# Patient Record
Sex: FEMALE | Race: WHITE | NOT HISPANIC OR LATINO | Employment: UNEMPLOYED | URBAN - METROPOLITAN AREA
[De-identification: names, ages, dates, MRNs, and addresses within clinical notes are randomized per-mention and may not be internally consistent; named-entity substitution may affect disease eponyms.]

---

## 2021-11-13 PROBLEM — R31.9 HEMATURIA: Status: ACTIVE | Noted: 2021-11-13

## 2021-11-13 PROBLEM — M32.9 SYSTEMIC LUPUS ERYTHEMATOSUS: Status: ACTIVE | Noted: 2021-11-13

## 2021-11-13 PROBLEM — Z72.0 TOBACCO ABUSE: Status: ACTIVE | Noted: 2021-11-13

## 2021-11-13 PROBLEM — G43.909 MIGRAINE: Status: ACTIVE | Noted: 2021-11-13

## 2021-11-13 PROBLEM — J44.9 COPD (CHRONIC OBSTRUCTIVE PULMONARY DISEASE): Status: ACTIVE | Noted: 2021-11-13

## 2021-11-14 PROBLEM — R10.9 FLANK PAIN: Status: ACTIVE | Noted: 2021-11-14

## 2023-01-25 ENCOUNTER — OFFICE VISIT (OUTPATIENT)
Dept: FAMILY MEDICINE | Facility: CLINIC | Age: 48
End: 2023-01-25
Payer: MEDICAID

## 2023-01-25 ENCOUNTER — TELEPHONE (OUTPATIENT)
Dept: FAMILY MEDICINE | Facility: CLINIC | Age: 48
End: 2023-01-25

## 2023-01-25 VITALS
DIASTOLIC BLOOD PRESSURE: 83 MMHG | WEIGHT: 169.75 LBS | RESPIRATION RATE: 18 BRPM | TEMPERATURE: 98 F | HEART RATE: 87 BPM | SYSTOLIC BLOOD PRESSURE: 118 MMHG | OXYGEN SATURATION: 96 % | BODY MASS INDEX: 33.15 KG/M2

## 2023-01-25 DIAGNOSIS — Z72.0 TOBACCO ABUSE: ICD-10-CM

## 2023-01-25 DIAGNOSIS — M32.9 SYSTEMIC LUPUS ERYTHEMATOSUS, UNSPECIFIED SLE TYPE, UNSPECIFIED ORGAN INVOLVEMENT STATUS: ICD-10-CM

## 2023-01-25 DIAGNOSIS — D22.9 ATYPICAL NEVI: ICD-10-CM

## 2023-01-25 DIAGNOSIS — J44.9 CHRONIC OBSTRUCTIVE PULMONARY DISEASE, UNSPECIFIED COPD TYPE: Primary | ICD-10-CM

## 2023-01-25 PROCEDURE — 1159F PR MEDICATION LIST DOCUMENTED IN MEDICAL RECORD: ICD-10-PCS | Mod: CPTII,S$GLB,, | Performed by: PHYSICIAN ASSISTANT

## 2023-01-25 PROCEDURE — 3074F PR MOST RECENT SYSTOLIC BLOOD PRESSURE < 130 MM HG: ICD-10-PCS | Mod: CPTII,S$GLB,, | Performed by: PHYSICIAN ASSISTANT

## 2023-01-25 PROCEDURE — 1160F PR REVIEW ALL MEDS BY PRESCRIBER/CLIN PHARMACIST DOCUMENTED: ICD-10-PCS | Mod: CPTII,S$GLB,, | Performed by: PHYSICIAN ASSISTANT

## 2023-01-25 PROCEDURE — 1160F RVW MEDS BY RX/DR IN RCRD: CPT | Mod: CPTII,S$GLB,, | Performed by: PHYSICIAN ASSISTANT

## 2023-01-25 PROCEDURE — 3008F PR BODY MASS INDEX (BMI) DOCUMENTED: ICD-10-PCS | Mod: CPTII,S$GLB,, | Performed by: PHYSICIAN ASSISTANT

## 2023-01-25 PROCEDURE — 99204 OFFICE O/P NEW MOD 45 MIN: CPT | Mod: S$GLB,,, | Performed by: PHYSICIAN ASSISTANT

## 2023-01-25 PROCEDURE — 3008F BODY MASS INDEX DOCD: CPT | Mod: CPTII,S$GLB,, | Performed by: PHYSICIAN ASSISTANT

## 2023-01-25 PROCEDURE — 1159F MED LIST DOCD IN RCRD: CPT | Mod: CPTII,S$GLB,, | Performed by: PHYSICIAN ASSISTANT

## 2023-01-25 PROCEDURE — 3079F PR MOST RECENT DIASTOLIC BLOOD PRESSURE 80-89 MM HG: ICD-10-PCS | Mod: CPTII,S$GLB,, | Performed by: PHYSICIAN ASSISTANT

## 2023-01-25 PROCEDURE — 99204 PR OFFICE/OUTPT VISIT, NEW, LEVL IV, 45-59 MIN: ICD-10-PCS | Mod: S$GLB,,, | Performed by: PHYSICIAN ASSISTANT

## 2023-01-25 PROCEDURE — 3079F DIAST BP 80-89 MM HG: CPT | Mod: CPTII,S$GLB,, | Performed by: PHYSICIAN ASSISTANT

## 2023-01-25 PROCEDURE — 3074F SYST BP LT 130 MM HG: CPT | Mod: CPTII,S$GLB,, | Performed by: PHYSICIAN ASSISTANT

## 2023-01-25 RX ORDER — BUDESONIDE AND FORMOTEROL FUMARATE DIHYDRATE 160; 4.5 UG/1; UG/1
2 AEROSOL RESPIRATORY (INHALATION) EVERY 12 HOURS
Qty: 10.2 G | Refills: 5 | Status: SHIPPED | OUTPATIENT
Start: 2023-01-25 | End: 2023-04-25 | Stop reason: SDUPTHER

## 2023-01-25 RX ORDER — HYDROCHLOROTHIAZIDE 25 MG/1
25 TABLET ORAL DAILY
COMMUNITY
End: 2023-04-25

## 2023-01-25 RX ORDER — METOPROLOL SUCCINATE 50 MG/1
50 TABLET, EXTENDED RELEASE ORAL DAILY
COMMUNITY
End: 2023-04-25

## 2023-01-25 RX ORDER — HYDROXYZINE HYDROCHLORIDE 50 MG/1
50 TABLET, FILM COATED ORAL 3 TIMES DAILY
COMMUNITY
End: 2023-04-25 | Stop reason: SDUPTHER

## 2023-01-25 RX ORDER — PREDNISONE 20 MG/1
20 TABLET ORAL 2 TIMES DAILY
COMMUNITY
End: 2023-02-13 | Stop reason: SDUPTHER

## 2023-01-25 RX ORDER — ALBUTEROL SULFATE 90 UG/1
2 AEROSOL, METERED RESPIRATORY (INHALATION) EVERY 6 HOURS PRN
Qty: 18 G | Refills: 0 | Status: SHIPPED | OUTPATIENT
Start: 2023-01-25 | End: 2023-04-25 | Stop reason: SDUPTHER

## 2023-01-25 RX ORDER — HYDROCODONE BITARTRATE AND ACETAMINOPHEN 10; 325 MG/1; MG/1
1 TABLET ORAL EVERY 6 HOURS PRN
Qty: 21 TABLET | Refills: 0 | Status: SHIPPED | OUTPATIENT
Start: 2023-01-25 | End: 2023-02-13 | Stop reason: SDUPTHER

## 2023-01-25 NOTE — TELEPHONE ENCOUNTER
Called pt, she was supposed to have MRI/MRA ordered by her neurologist at [facility], but it was out of her 30 day window and needed a new clearance. During time, neurology was able to get in touch with radiology and everything was taken care of.

## 2023-01-25 NOTE — PROGRESS NOTES
Subjective:       Patient ID: Wanda May is a 47 y.o. female.    Chief Complaint: Establish Care    Patient is a 46 yo female coming in to establish care with me. She has multiple medical issues    Patient Active Problem List:     Hematuria     Systemic lupus erythematosus: condition is uncontrolled. She has no rheumatologist that follows her       COPD (chronic obstructive pulmonary disease): condition is uncontrolled. She has no pulmologist that follows her.      Tobacco abuse: Report that she has tried multiple medications to quit which none have worked.        Flank pain    Past Medical History:  No date: Anxiety  No date: Arthritis  No date: COPD (chronic obstructive pulmonary disease)  No date: COVID-19      Comment:  2021  No date: IBS (irritable bowel syndrome)  No date: Interstitial cystitis (chronic) with hematuria      Comment:  per patient  No date: Lupus      Comment:  per patient  No date: Migraine headache  No date: Mixed connective tissue disease      Comment:  per patient  No date: Nephrolithiasis    Past Surgical History:  No date: APPENDECTOMY  :  SECTION  No date: CHOLECYSTECTOMY  No date: Cyst removed from urethra  No date: HYSTERECTOMY    Review of patient's family history indicates:      Social History    Socioeconomic History      Marital status:     Tobacco Use      Smoking status: Every Day        Packs/day: 1.00        Types: Cigarettes      Smokeless tobacco: Never    Substance and Sexual Activity      Alcohol use: Yes        Comment: Occasionally      Drug use: Never      Review of patient's allergies indicates:   -- Ketorolac -- Shortness Of Breath, Anaphylaxis,                            Hives, Itching and Swelling    --  Other reaction(s): Other (See Comments), Other             (See Comments)Throat swelling             states throat closes             states throat closes   -- Levofloxacin -- Anaphylaxis, Hives, Shortness Of                             Breath and Swelling    --  Throat swelling   -- Metoclopramide -- Hives, Itching and Swelling    --  Other reaction(s): Other (See Comments), Other             (See Comments)             Throat swelling             States throat closes             States throat closes   -- Reglan [metoclopramide hcl] -- Anaphylaxis   -- Sulfa (sulfonamide antibiotics) -- Anaphylaxis    --  Other reaction(s): Other (See Comments), Other             (See Comments)             Childhood allergy (unknown reaction)             Doesn't know    Current Outpatient Medications: ·  butalbital-acetaminophen-caffeine -40 mg (FIORICET, ESGIC) -40 mg per tablet, Take 1 tablet by mouth every 6 (six) hours as needed for Headaches., Disp: , Rfl: ·  cyanocobalamin 1,000 mcg/mL injection, Inject 1,000 mcg into the muscle once a week., Disp: , Rfl: ·  diazePAM (VALIUM) 10 MG Tab, Take 10 mg by mouth 2 (two) times daily as needed., Disp: , Rfl: ·  ergocalciferol (ERGOCALCIFEROL) 50,000 unit Cap, Take 50,000 Units by mouth every 7 days., Disp: , Rfl: ·  hydroCHLOROthiazide (HYDRODIURIL) 25 MG tablet, Take 25 mg by mouth once daily., Disp: , Rfl: ·  hydrOXYzine (ATARAX) 50 MG tablet, Take 50 mg by mouth 3 (three) times daily., Disp: , Rfl: ·  methocarbamoL (ROBAXIN) 500 MG Tab, Take 500 mg by mouth 3 (three) times daily., Disp: , Rfl: ·  metoprolol succinate (TOPROL-XL) 50 MG 24 hr tablet, Take 50 mg by mouth once daily. Take one tablet by mouth two times daily, Disp: , Rfl: ·  ondansetron (ZOFRAN) 4 MG tablet, Take 8 mg by mouth every 6 (six) hours as needed for Nausea., Disp: , Rfl: ·  predniSONE (DELTASONE) 20 MG tablet, Take 20 mg by mouth 2 (two) times daily., Disp: , Rfl: ·  promethazine (PHENERGAN) 25 MG tablet, Take 25 mg by mouth every 6 (six) hours as needed for Nausea., Disp: , Rfl: ·  propranoloL (INDERAL) 10 MG tablet, Take 10 mg by mouth 2 (two) times daily., Disp: , Rfl: ·  albuterol (VENTOLIN HFA) 90 mcg/actuation inhaler,  Inhale 2 puffs into the lungs every 6 (six) hours as needed for Wheezing. Rescue, Disp: 18 g, Rfl: 0·  bacitracin 500 unit/gram Oint, Apply topically 2 (two) times daily. (Patient not taking: Reported on 1/25/2023), Disp: 30 g, Rfl: 0·  budesonide-formoterol 160-4.5 mcg (SYMBICORT) 160-4.5 mcg/actuation HFAA, Inhale 2 puffs into the lungs every 12 (twelve) hours. Controller, Disp: 10.2 g, Rfl: 5·  HYDROcodone-acetaminophen (NORCO)  mg per tablet, Take 1 tablet by mouth every 6 (six) hours as needed for Pain., Disp: 21 tablet, Rfl: 0·  ibuprofen (ADVIL,MOTRIN) 800 MG tablet, Take 800 mg by mouth every 8 (eight) hours as needed for Pain., Disp: , Rfl:     /83   Pulse 87   Temp 97.9 °F (36.6 °C)   Resp 18   Wt 77 kg (169 lb 12.1 oz)   SpO2 96%   BMI 33.15 kg/m²             Review of Systems   Constitutional:  Positive for fatigue. Negative for activity change, chills and fever.   HENT: Negative.     Eyes: Negative.    Respiratory:  Negative for cough, chest tightness and shortness of breath.    Cardiovascular:  Positive for chest pain.        Has chronic rib pain   Gastrointestinal:  Positive for abdominal pain. Negative for blood in stool and nausea.   Endocrine: Negative.    Genitourinary: Negative.    Musculoskeletal:  Positive for arthralgias, back pain, joint swelling and myalgias.   Integumentary:  Positive for mole/lesion.        Has several moles which she desires to see Dermatology   Allergic/Immunologic: Negative.    Neurological:  Negative for dizziness, seizures, weakness, headaches and memory loss.   Hematological:  Negative for adenopathy. Does not bruise/bleed easily.   Psychiatric/Behavioral:  Positive for dysphoric mood. Negative for sleep disturbance and suicidal ideas. The patient is nervous/anxious.        Objective:      Physical Exam  Vitals reviewed.   Constitutional:       General: She is not in acute distress.     Appearance: Normal appearance. She is not ill-appearing,  toxic-appearing or diaphoretic.   HENT:      Head: Normocephalic and atraumatic.      Right Ear: Tympanic membrane, ear canal and external ear normal. There is no impacted cerumen.      Left Ear: Tympanic membrane, ear canal and external ear normal. There is no impacted cerumen.      Nose: Nose normal. No congestion or rhinorrhea.      Mouth/Throat:      Mouth: Mucous membranes are moist.   Eyes:      Conjunctiva/sclera: Conjunctivae normal.   Neck:      Vascular: No carotid bruit.   Cardiovascular:      Rate and Rhythm: Normal rate and regular rhythm.      Pulses: Normal pulses.      Heart sounds: Normal heart sounds. No murmur heard.    No friction rub. No gallop.   Pulmonary:      Effort: Pulmonary effort is normal. No respiratory distress.      Breath sounds: Normal breath sounds. No stridor. No wheezing, rhonchi or rales.   Chest:      Chest wall: Tenderness present.   Abdominal:      General: There is no distension.      Palpations: Abdomen is soft. There is no mass.      Tenderness: There is abdominal tenderness. There is no right CVA tenderness, left CVA tenderness, guarding or rebound.      Hernia: No hernia is present.   Musculoskeletal:         General: Tenderness present. No swelling.      Cervical back: No rigidity or tenderness.   Lymphadenopathy:      Cervical: No cervical adenopathy.   Skin:     General: Skin is warm and dry.      Coloration: Skin is not jaundiced.   Neurological:      Mental Status: She is alert.       Assessment:       Problem List Items Addressed This Visit       Tobacco abuse    Systemic lupus erythematosus    Relevant Orders    Ambulatory referral/consult to Rheumatology    COPD (chronic obstructive pulmonary disease) - Primary    Relevant Orders    Ambulatory referral/consult to Pulmonology     Other Visit Diagnoses       Atypical nevi        Relevant Orders    Ambulatory referral/consult to Dermatology              Plan:       Chronic obstructive pulmonary disease, unspecified  COPD type  uncontrolled   -     Ambulatory referral/consult to Pulmonology; Future; Expected date: 02/01/2023    Systemic lupus erythematosus, unspecified SLE type, unspecified organ involvement status  uncontrolled   -     Ambulatory referral/consult to Rheumatology; Future; Expected date: 02/01/2023    Atypical nevi  -     Ambulatory referral/consult to Dermatology; Future; Expected date: 02/01/2023    Tobacco abuse  Resistant to quit    Other orders  -     budesonide-formoterol 160-4.5 mcg (SYMBICORT) 160-4.5 mcg/actuation HFAA; Inhale 2 puffs into the lungs every 12 (twelve) hours. Controller  Dispense: 10.2 g; Refill: 5  -     albuterol (VENTOLIN HFA) 90 mcg/actuation inhaler; Inhale 2 puffs into the lungs every 6 (six) hours as needed for Wheezing. Rescue  Dispense: 18 g; Refill: 0  -     HYDROcodone-acetaminophen (NORCO)  mg per tablet; Take 1 tablet by mouth every 6 (six) hours as needed for Pain.  Dispense: 21 tablet; Refill: 0       I spent 45 minutes on this encounter, time includes face-to-face, chart review, documentation, test review and orders.

## 2023-01-25 NOTE — TELEPHONE ENCOUNTER
----- Message from Dennis Teran sent at 1/25/2023 12:29 PM CST -----  Contact: Self  Type: Needs Medical Advice  Who Called:  Patient  Best Call Back Number: [phone]   Additional Information:  States she needs anesthesia clearance for MRI at [place]. Fax: [phone]    Would also like to speak with office to discuss

## 2023-01-25 NOTE — LETTER
AUTHORIZATION FOR RELEASE OF   CONFIDENTIAL INFORMATION    Dear Maykel NAPOLES,    We are seeing Wanda May, date of birth 1975, in the clinic at [facility]. Adria Muse III, PA-C is the patient's PCP. Wanda May has an outstanding lab/procedure at the time we reviewed her chart. In order to help keep her health information updated, she has authorized us to request the following medical record(s):        (  )  MAMMOGRAM                                      (  )  COLONOSCOPY      (  )  PAP SMEAR                                          ( XX )  OUTSIDE LAB RESULTS     (  )  DEXA SCAN                                          (  )  EYE EXAM            (  )  FOOT EXAM                                          (  )  ENTIRE RECORD     (  )  OUTSIDE IMMUNIZATIONS                 ( XX ) Recent CT Results          Please fax records to [facility] at [phone]      If you have any questions, please contact us at [phone].            Patient Name: Wanda May  : 1975  Patient Phone #: [phone]

## 2023-01-25 NOTE — TELEPHONE ENCOUNTER
Pt seen today to est care. She has dx of erica and needs to see Rheumatology, but medicaid. Any providers taking new medicaid?  Please advise.

## 2023-01-25 NOTE — TELEPHONE ENCOUNTER
----- Message from Evangelina Huerta sent at 1/25/2023  2:22 PM CST -----  Name of Who is Calling:STEFAN HEATH [[id number]]       What is the request in detail: pt stated that mri/mra fax can be disregarded. Pt does not need it anymore. Pt cancelled appts and will wait to see if dr would like to reschedule        Can the clinic reply by [facility]: Yes        [facility] [phone]

## 2023-02-09 ENCOUNTER — PATIENT MESSAGE (OUTPATIENT)
Dept: FAMILY MEDICINE | Facility: CLINIC | Age: 48
End: 2023-02-09
Payer: MEDICAID

## 2023-02-09 RX ORDER — HYDROCODONE BITARTRATE AND ACETAMINOPHEN 10; 325 MG/1; MG/1
1 TABLET ORAL EVERY 6 HOURS PRN
Qty: 21 TABLET | Refills: 0 | OUTPATIENT
Start: 2023-02-09

## 2023-02-13 ENCOUNTER — OFFICE VISIT (OUTPATIENT)
Dept: FAMILY MEDICINE | Facility: CLINIC | Age: 48
End: 2023-02-13
Payer: MEDICAID

## 2023-02-13 ENCOUNTER — HOSPITAL ENCOUNTER (OUTPATIENT)
Dept: RADIOLOGY | Facility: CLINIC | Age: 48
Discharge: HOME | End: 2023-02-13
Attending: PHYSICIAN ASSISTANT
Payer: MEDICAID

## 2023-02-13 VITALS
DIASTOLIC BLOOD PRESSURE: 82 MMHG | BODY MASS INDEX: 33.52 KG/M2 | OXYGEN SATURATION: 97 % | WEIGHT: 171.63 LBS | SYSTOLIC BLOOD PRESSURE: 138 MMHG | HEART RATE: 99 BPM

## 2023-02-13 DIAGNOSIS — M54.9 DORSALGIA, UNSPECIFIED: ICD-10-CM
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
## 2023-02-13 LAB
BILIRUBIN, UA POC OHS: NEGATIVE
BLOOD, UA POC OHS: NEGATIVE
CLARITY, UA POC OHS: CLEAR
COLOR, UA POC OHS: YELLOW
GLUCOSE, UA POC OHS: NEGATIVE
KETONES, UA POC OHS: NEGATIVE
LEUKOCYTES, UA POC OHS: NEGATIVE
NITRITE, UA POC OHS: NEGATIVE
PH, UA POC OHS: 6
PROTEIN, UA POC OHS: NEGATIVE
SPECIFIC GRAVITY, UA POC OHS: 1.02
UROBILINOGEN, UA POC OHS: 0.2

## 2023-02-13 PROCEDURE — 3008F BODY MASS INDEX DOCD: CPT | Mod: CPTII,S$GLB,, | Performed by: PHYSICIAN ASSISTANT

## 2023-02-13 PROCEDURE — 3075F PR MOST RECENT SYSTOLIC BLOOD PRESS GE 130-139MM HG: ICD-10-PCS | Mod: CPTII,S$GLB,, | Performed by: PHYSICIAN ASSISTANT

## 2023-02-13 PROCEDURE — 81003 URINALYSIS AUTO W/O SCOPE: CPT | Mod: QW,S$GLB,, | Performed by: PHYSICIAN ASSISTANT

## 2023-02-13 PROCEDURE — 1160F RVW MEDS BY RX/DR IN RCRD: CPT | Mod: CPTII,S$GLB,, | Performed by: PHYSICIAN ASSISTANT

## 2023-02-13 PROCEDURE — 1160F PR REVIEW ALL MEDS BY PRESCRIBER/CLIN PHARMACIST DOCUMENTED: ICD-10-PCS | Mod: CPTII,S$GLB,, | Performed by: PHYSICIAN ASSISTANT

## 2023-02-13 PROCEDURE — 72100 X-RAY EXAM L-S SPINE 2/3 VWS: CPT | Mod: S$GLB,,, | Performed by: RADIOLOGY

## 2023-02-13 PROCEDURE — 1159F MED LIST DOCD IN RCRD: CPT | Mod: CPTII,S$GLB,, | Performed by: PHYSICIAN ASSISTANT

## 2023-02-13 PROCEDURE — 87086 URINE CULTURE/COLONY COUNT: CPT | Performed by: PHYSICIAN ASSISTANT

## 2023-02-13 PROCEDURE — 3008F PR BODY MASS INDEX (BMI) DOCUMENTED: ICD-10-PCS | Mod: CPTII,S$GLB,, | Performed by: PHYSICIAN ASSISTANT

## 2023-02-13 PROCEDURE — 3075F SYST BP GE 130 - 139MM HG: CPT | Mod: CPTII,S$GLB,, | Performed by: PHYSICIAN ASSISTANT

## 2023-02-13 PROCEDURE — 3079F PR MOST RECENT DIASTOLIC BLOOD PRESSURE 80-89 MM HG: ICD-10-PCS | Mod: CPTII,S$GLB,, | Performed by: PHYSICIAN ASSISTANT

## 2023-02-13 PROCEDURE — 72100 XR LUMBAR SPINE 2 OR 3 VIEWS: ICD-10-PCS | Mod: S$GLB,,, | Performed by: RADIOLOGY

## 2023-02-13 PROCEDURE — 1159F PR MEDICATION LIST DOCUMENTED IN MEDICAL RECORD: ICD-10-PCS | Mod: CPTII,S$GLB,, | Performed by: PHYSICIAN ASSISTANT

## 2023-02-13 PROCEDURE — 81003 POCT URINALYSIS(INSTRUMENT): ICD-10-PCS | Mod: QW,S$GLB,, | Performed by: PHYSICIAN ASSISTANT

## 2023-02-13 PROCEDURE — 99215 OFFICE O/P EST HI 40 MIN: CPT | Mod: S$GLB,,, | Performed by: PHYSICIAN ASSISTANT

## 2023-02-13 PROCEDURE — 99215 PR OFFICE/OUTPT VISIT, EST, LEVL V, 40-54 MIN: ICD-10-PCS | Mod: S$GLB,,, | Performed by: PHYSICIAN ASSISTANT

## 2023-02-13 PROCEDURE — 3079F DIAST BP 80-89 MM HG: CPT | Mod: CPTII,S$GLB,, | Performed by: PHYSICIAN ASSISTANT

## 2023-02-13 RX ORDER — PREDNISONE 20 MG/1
20 TABLET ORAL 2 TIMES DAILY
Qty: 20 TABLET | Refills: 0 | Status: SHIPPED | OUTPATIENT
Start: 2023-02-13 | End: 2023-04-25

## 2023-02-13 RX ORDER — HYDROCODONE BITARTRATE AND ACETAMINOPHEN 10; 325 MG/1; MG/1
1 TABLET ORAL EVERY 6 HOURS PRN
Qty: 21 TABLET | Refills: 0 | Status: SHIPPED | OUTPATIENT
Start: 2023-02-13 | End: 2023-03-21 | Stop reason: SDUPTHER

## 2023-02-13 RX ORDER — ONDANSETRON 4 MG/1
8 TABLET, FILM COATED ORAL EVERY 6 HOURS PRN
Qty: 20 TABLET | Refills: 5 | Status: SHIPPED | OUTPATIENT
Start: 2023-02-13 | End: 2023-04-25

## 2023-02-13 NOTE — PROGRESS NOTES
Patient ID: Wanda Hirsch is a 47 y.o. female.     Chief Complaint: Fall (Rib Pain)     48yo female with SLE and COPD presents complaining of pain in lower back and flank areas due to a fall last Thursday morning. Pt also complains of chronic abdominal pain and bloody tissue expulsion while urinating.      Fall  The accident occurred 3 to 5 days ago. Fall occurred: In patient's home from standing position while walking. Pt tripped over a TV box. There was no blood loss. The pain is present in the back (Lower back and bilateral flanks). The pain is severe. The symptoms are aggravated by ambulation, standing and pressure on injury (Pt cannot lay on side without pain). Associated symptoms include abdominal pain, hematuria and nausea. Pertinent negatives include no bowel incontinence, fever, headaches, hearing loss, loss of consciousness, numbness, tingling, visual change or vomiting. Associated symptoms comments: Pt notes chronic urinary sx due to interstitial cystitis. Treatments tried: ibuprofen, prescription NARCO, zofran. The treatment provided mild relief.   Review of Systems   Constitutional: Negative.  Negative for fever.   HENT: Negative.     Eyes: Negative.    Respiratory:  Positive for shortness of breath.         SOB upon inspiration   Cardiovascular: Negative.    Gastrointestinal:  Positive for abdominal pain and nausea. Negative for bowel incontinence and vomiting.   Genitourinary:  Positive for flank pain and hematuria.        Pt showed photo of clot-like bloody tissue that expelled while she was straining to urinate.   Musculoskeletal:  Positive for back pain.   Neurological:  Positive for dizziness. Negative for tingling, loss of consciousness, syncope, numbness and headaches.            Past Medical History:   Diagnosis Date    Anxiety      Arthritis      COPD (chronic obstructive pulmonary disease)      COVID-19 sept 2021    IBS (irritable bowel syndrome)      Interstitial cystitis (chronic)  with hematuria       per patient    Lupus       per patient    Migraine headache      Mixed connective tissue disease       per patient    Nephrolithiasis        Objective:   Physical Exam  Vitals reviewed.   Constitutional:       Appearance: Normal appearance.      Comments: Pt looks to be in pain   HENT:      Head: Normocephalic and atraumatic.   Cardiovascular:      Rate and Rhythm: Normal rate and regular rhythm.      Pulses: Normal pulses.      Heart sounds: No murmur heard.    No friction rub. No gallop.   Pulmonary:      Effort: Pulmonary effort is normal.      Breath sounds: Normal breath sounds. No wheezing, rhonchi or rales.         Comments: Light bruising and tenderness on bilateral flank areas  Abdominal:      General: Bowel sounds are normal. There is no distension.      Palpations: Abdomen is soft.      Tenderness: There is abdominal tenderness. There is no rebound.   Neurological:      Mental Status: She is alert.       Assessment:       Problem List Items Addressed This Visit    None  Visit Diagnoses         Fall, initial encounter    -  Primary     Relevant Orders     POCT Urinalysis(Instrument) (Completed)     Urine culture     Dorsalgia, unspecified         Relevant Orders     X-Ray Lumbar Spine 2 Or 3 Views             Plan:           Fall, initial encounter  -     POCT Urinalysis(Instrument)  -     Urine culture     Dorsalgia, unspecified  -     X-Ray Lumbar Spine 2 Or 3 Views; Future; Expected date: 02/13/2023     Other orders  -     HYDROcodone-acetaminophen (NORCO)  mg per tablet; Take 1 tablet by mouth every 6 (six) hours as needed for Pain.  Dispense: 21 tablet; Refill: 0  -     predniSONE (DELTASONE) 20 MG tablet; Take 1 tablet (20 mg total) by mouth 2 (two) times daily.  Dispense: 20 tablet; Refill: 0  -     ondansetron (ZOFRAN) 4 MG tablet; Take 2 tablets (8 mg total) by mouth every 6 (six) hours as needed for Nausea.  Dispense: 20 tablet; Refill: 5      --Pt can use heating pad  on area as needed for pain relief.  --Pt to contact clinic should symptoms worsen or not improve within 7-10 days.     I spent 30 minutes on this encounter including face-to-face encounter, chart review, documentation, test review, and orders.

## 2023-02-13 NOTE — MEDICAL/APP STUDENT
Subjective:       Patient ID: Wanda Hirsch is a 47 y.o. female.    Chief Complaint: Fall (Rib Pain)    46yo female with SLE and COPD presents complaining of pain in lower back and flank areas due to a fall last Thursday morning. Pt also complains of chronic abdominal pain and bloody tissue expulsion while urinating.     Fall  The accident occurred 3 to 5 days ago. Fall occurred: In patient's home from standing position while walking. Pt tripped over a TV box. There was no blood loss. The pain is present in the back (Lower back and bilateral flanks). The pain is severe. The symptoms are aggravated by ambulation, standing and pressure on injury (Pt cannot lay on side without pain). Associated symptoms include abdominal pain, hematuria and nausea. Pertinent negatives include no bowel incontinence, fever, headaches, hearing loss, loss of consciousness, numbness, tingling, visual change or vomiting. Associated symptoms comments: Pt notes chronic urinary sx due to interstitial cystitis. Treatments tried: ibuprofen, prescription NARCO, zofran. The treatment provided mild relief.   Review of Systems   Constitutional: Negative.  Negative for fever.   HENT: Negative.     Eyes: Negative.    Respiratory:  Positive for shortness of breath.         SOB upon inspiration   Cardiovascular: Negative.    Gastrointestinal:  Positive for abdominal pain and nausea. Negative for bowel incontinence and vomiting.   Genitourinary:  Positive for flank pain and hematuria.        Pt showed photo of clot-like bloody tissue that expelled while she was straining to urinate.   Musculoskeletal:  Positive for back pain.   Neurological:  Positive for dizziness. Negative for tingling, loss of consciousness, syncope, numbness and headaches.       Past Medical History:   Diagnosis Date    Anxiety     Arthritis     COPD (chronic obstructive pulmonary disease)     COVID-19 sept 2021    IBS (irritable bowel syndrome)     Interstitial cystitis  (chronic) with hematuria     per patient    Lupus     per patient    Migraine headache     Mixed connective tissue disease     per patient    Nephrolithiasis       Objective:      Physical Exam  Vitals reviewed.   Constitutional:       Appearance: Normal appearance.      Comments: Pt looks to be in pain   HENT:      Head: Normocephalic and atraumatic.   Cardiovascular:      Rate and Rhythm: Normal rate and regular rhythm.      Pulses: Normal pulses.      Heart sounds: No murmur heard.    No friction rub. No gallop.   Pulmonary:      Effort: Pulmonary effort is normal.      Breath sounds: Normal breath sounds. No wheezing, rhonchi or rales.          Comments: Light bruising and tenderness on bilateral flank areas  Abdominal:      General: Bowel sounds are normal. There is no distension.      Palpations: Abdomen is soft.      Tenderness: There is abdominal tenderness. There is no rebound.   Neurological:      Mental Status: She is alert.       Assessment:       Problem List Items Addressed This Visit    None  Visit Diagnoses       Fall, initial encounter    -  Primary    Relevant Orders    POCT Urinalysis(Instrument) (Completed)    Urine culture    Dorsalgia, unspecified        Relevant Orders    X-Ray Lumbar Spine 2 Or 3 Views              Plan:           Fall, initial encounter  -     POCT Urinalysis(Instrument)  -     Urine culture    Dorsalgia, unspecified  -     X-Ray Lumbar Spine 2 Or 3 Views; Future; Expected date: 02/13/2023    Other orders  -     HYDROcodone-acetaminophen (NORCO)  mg per tablet; Take 1 tablet by mouth every 6 (six) hours as needed for Pain.  Dispense: 21 tablet; Refill: 0  -     predniSONE (DELTASONE) 20 MG tablet; Take 1 tablet (20 mg total) by mouth 2 (two) times daily.  Dispense: 20 tablet; Refill: 0  -     ondansetron (ZOFRAN) 4 MG tablet; Take 2 tablets (8 mg total) by mouth every 6 (six) hours as needed for Nausea.  Dispense: 20 tablet; Refill: 5     --Pt can use heating pad on  area as needed for pain relief.  --Pt to contact clinic should symptoms worsen or not improve within 7-10 days.    I spent 30 minutes on this encounter including face-to-face encounter, chart review, documentation, test review, and orders.

## 2023-02-14 ENCOUNTER — PATIENT MESSAGE (OUTPATIENT)
Dept: ADMINISTRATIVE | Facility: HOSPITAL | Age: 48
End: 2023-02-14
Payer: MEDICAID

## 2023-02-14 LAB — BACTERIA UR CULT: NO GROWTH

## 2023-02-15 DIAGNOSIS — Z12.31 OTHER SCREENING MAMMOGRAM: ICD-10-CM

## 2023-02-16 DIAGNOSIS — Z12.11 SCREENING FOR COLON CANCER: ICD-10-CM

## 2023-03-02 RX ORDER — HYDROCODONE BITARTRATE AND ACETAMINOPHEN 10; 325 MG/1; MG/1
TABLET ORAL
Qty: 21 TABLET | OUTPATIENT
Start: 2023-03-02

## 2023-03-03 ENCOUNTER — PATIENT MESSAGE (OUTPATIENT)
Dept: FAMILY MEDICINE | Facility: CLINIC | Age: 48
End: 2023-03-03
Payer: MEDICAID

## 2023-03-18 LAB — HEMOCCULT STL QL IA: NEGATIVE

## 2023-03-21 ENCOUNTER — OFFICE VISIT (OUTPATIENT)
Dept: FAMILY MEDICINE | Facility: CLINIC | Age: 48
End: 2023-03-21
Payer: MEDICAID

## 2023-03-21 VITALS
SYSTOLIC BLOOD PRESSURE: 144 MMHG | DIASTOLIC BLOOD PRESSURE: 98 MMHG | OXYGEN SATURATION: 99 % | HEART RATE: 80 BPM | BODY MASS INDEX: 33.6 KG/M2 | WEIGHT: 172.06 LBS

## 2023-03-21 DIAGNOSIS — R10.31 RIGHT INGUINAL PAIN: ICD-10-CM

## 2023-03-21 DIAGNOSIS — R07.89 LEFT-SIDED CHEST WALL PAIN: Primary | ICD-10-CM

## 2023-03-21 DIAGNOSIS — R10.31 RIGHT LOWER QUADRANT PAIN: ICD-10-CM

## 2023-03-21 PROCEDURE — 1159F MED LIST DOCD IN RCRD: CPT | Mod: CPTII,S$GLB,, | Performed by: PHYSICIAN ASSISTANT

## 2023-03-21 PROCEDURE — 1159F PR MEDICATION LIST DOCUMENTED IN MEDICAL RECORD: ICD-10-PCS | Mod: CPTII,S$GLB,, | Performed by: PHYSICIAN ASSISTANT

## 2023-03-21 PROCEDURE — 3077F PR MOST RECENT SYSTOLIC BLOOD PRESSURE >= 140 MM HG: ICD-10-PCS | Mod: CPTII,S$GLB,, | Performed by: PHYSICIAN ASSISTANT

## 2023-03-21 PROCEDURE — 99214 PR OFFICE/OUTPT VISIT, EST, LEVL IV, 30-39 MIN: ICD-10-PCS | Mod: S$GLB,,, | Performed by: PHYSICIAN ASSISTANT

## 2023-03-21 PROCEDURE — 3008F PR BODY MASS INDEX (BMI) DOCUMENTED: ICD-10-PCS | Mod: CPTII,S$GLB,, | Performed by: PHYSICIAN ASSISTANT

## 2023-03-21 PROCEDURE — 3008F BODY MASS INDEX DOCD: CPT | Mod: CPTII,S$GLB,, | Performed by: PHYSICIAN ASSISTANT

## 2023-03-21 PROCEDURE — 3077F SYST BP >= 140 MM HG: CPT | Mod: CPTII,S$GLB,, | Performed by: PHYSICIAN ASSISTANT

## 2023-03-21 PROCEDURE — 3080F DIAST BP >= 90 MM HG: CPT | Mod: CPTII,S$GLB,, | Performed by: PHYSICIAN ASSISTANT

## 2023-03-21 PROCEDURE — 3080F PR MOST RECENT DIASTOLIC BLOOD PRESSURE >= 90 MM HG: ICD-10-PCS | Mod: CPTII,S$GLB,, | Performed by: PHYSICIAN ASSISTANT

## 2023-03-21 PROCEDURE — 99214 OFFICE O/P EST MOD 30 MIN: CPT | Mod: S$GLB,,, | Performed by: PHYSICIAN ASSISTANT

## 2023-03-21 RX ORDER — HYDROCODONE BITARTRATE AND ACETAMINOPHEN 10; 325 MG/1; MG/1
1 TABLET ORAL EVERY 6 HOURS PRN
Qty: 21 TABLET | Refills: 0 | Status: SHIPPED | OUTPATIENT
Start: 2023-03-21 | End: 2023-04-25 | Stop reason: SDUPTHER

## 2023-03-21 NOTE — PROGRESS NOTES
Subjective:       Patient ID: Wanda Hirsch is a 47 y.o. female.    Chief Complaint: Fall (F/u from fall) and Shortness of Breath    Chest Pain   This is a recurrent problem. The current episode started more than 1 month ago. The onset quality is undetermined. The problem occurs constantly. The problem has been unchanged. Pain location: left rib area. The quality of the pain is described as sharp. The pain does not radiate. Associated symptoms include abdominal pain. Pertinent negatives include no fever, nausea, shortness of breath or vomiting. The pain is aggravated by movement (palpation). The treatment provided no relief. Risk factors include sedentary lifestyle.   Abdominal Pain  This is a recurrent problem. The current episode started more than 1 month ago. The onset quality is undetermined. The problem has been waxing and waning. The pain is located in the RLQ. The pain is severe. The quality of the pain is cramping and a sensation of fullness. The abdominal pain does not radiate. Pertinent negatives include no constipation, diarrhea, fever, nausea or vomiting. Nothing aggravates the pain. The pain is relieved by Nothing. She has tried nothing for the symptoms. The treatment provided no relief.   Review of Systems   Constitutional:  Negative for activity change, fatigue and fever.   HENT: Negative.     Eyes: Negative.    Respiratory:  Negative for chest tightness, shortness of breath and wheezing.    Cardiovascular:  Positive for chest pain.   Gastrointestinal:  Positive for abdominal pain. Negative for anal bleeding, blood in stool, change in bowel habit, constipation, diarrhea, nausea, vomiting, reflux and change in bowel habit.   Musculoskeletal: Negative.    Integumentary:  Negative.       Past Medical History:   Diagnosis Date    Anxiety     Arthritis     COPD (chronic obstructive pulmonary disease)     COVID-19     sept 2021    IBS (irritable bowel syndrome)     Interstitial cystitis (chronic) with  hematuria     per patient    Lupus     per patient    Migraine headache     Mixed connective tissue disease     per patient    Nephrolithiasis        Objective:      Physical Exam  Constitutional:       General: She is not in acute distress.     Appearance: Normal appearance. She is ill-appearing. She is not toxic-appearing or diaphoretic.   Cardiovascular:      Rate and Rhythm: Normal rate and regular rhythm.      Pulses: Normal pulses.      Heart sounds: Normal heart sounds. No murmur heard.    No friction rub. No gallop.   Pulmonary:      Effort: Pulmonary effort is normal. No respiratory distress.      Breath sounds: Normal breath sounds. No stridor. No wheezing, rhonchi or rales.          Comments: Tenderness  Chest:      Chest wall: No tenderness.   Abdominal:      General: Abdomen is protuberant. Bowel sounds are normal. There is no abdominal bruit. There are no signs of injury.      Palpations: Abdomen is soft. There is no shifting dullness, fluid wave, hepatomegaly, splenomegaly, mass or pulsatile mass.      Tenderness: There is abdominal tenderness in the right lower quadrant.       Neurological:      Mental Status: She is alert.       Assessment:       Problem List Items Addressed This Visit    None  Visit Diagnoses       Left-sided chest wall pain    -  Primary    Relevant Orders    CT Chest With Contrast    Right inguinal pain        Right lower quadrant pain        Relevant Orders    CT Abdomen Pelvis W Wo Contrast              Plan:       Left-sided chest wall pain  -     CT Chest With Contrast; Future; Expected date: 03/21/2023    Right inguinal pain    Right lower quadrant pain  -     CT Abdomen Pelvis W Wo Contrast; Future; Expected date: 03/21/2023    Other orders  -     HYDROcodone-acetaminophen (NORCO)  mg per tablet; Take 1 tablet by mouth every 6 (six) hours as needed for Pain.  Dispense: 21 tablet; Refill: 0       Recommended to go to the ER if things get worse.     I spent 30 minutes  on this encounter, time includes face-to-face, chart review, documentation, test review and orders.     .

## 2023-04-25 ENCOUNTER — OFFICE VISIT (OUTPATIENT)
Dept: PRIMARY CARE CLINIC | Facility: CLINIC | Age: 48
End: 2023-04-25
Payer: MEDICAID

## 2023-04-25 ENCOUNTER — TELEPHONE (OUTPATIENT)
Dept: PRIMARY CARE CLINIC | Facility: CLINIC | Age: 48
End: 2023-04-25

## 2023-04-25 VITALS
WEIGHT: 167.69 LBS | HEART RATE: 91 BPM | DIASTOLIC BLOOD PRESSURE: 92 MMHG | SYSTOLIC BLOOD PRESSURE: 146 MMHG | BODY MASS INDEX: 32.74 KG/M2 | OXYGEN SATURATION: 97 %

## 2023-04-25 DIAGNOSIS — F41.9 ANXIETY: ICD-10-CM

## 2023-04-25 DIAGNOSIS — G43.819 OTHER MIGRAINE WITHOUT STATUS MIGRAINOSUS, INTRACTABLE: ICD-10-CM

## 2023-04-25 DIAGNOSIS — E53.8 B12 DEFICIENCY: ICD-10-CM

## 2023-04-25 DIAGNOSIS — I10 HYPERTENSION, UNSPECIFIED TYPE: ICD-10-CM

## 2023-04-25 DIAGNOSIS — M32.9 SYSTEMIC LUPUS ERYTHEMATOSUS, UNSPECIFIED SLE TYPE, UNSPECIFIED ORGAN INVOLVEMENT STATUS: Primary | ICD-10-CM

## 2023-04-25 DIAGNOSIS — E55.9 VITAMIN D DEFICIENCY DISEASE: ICD-10-CM

## 2023-04-25 DIAGNOSIS — J44.9 CHRONIC OBSTRUCTIVE PULMONARY DISEASE, UNSPECIFIED COPD TYPE: ICD-10-CM

## 2023-04-25 DIAGNOSIS — Z00.00 WELLNESS EXAMINATION: ICD-10-CM

## 2023-04-25 PROBLEM — G43.919 INTRACTABLE MIGRAINE WITHOUT STATUS MIGRAINOSUS: Status: ACTIVE | Noted: 2023-04-25

## 2023-04-25 LAB
ALBUMIN SERPL BCP-MCNC: 4.1 G/DL (ref 3.5–5.2)
ALP SERPL-CCNC: 128 U/L (ref 55–135)
ALT SERPL W/O P-5'-P-CCNC: 17 U/L (ref 10–44)
ANION GAP SERPL CALC-SCNC: 9 MMOL/L (ref 8–16)
AST SERPL-CCNC: 16 U/L (ref 10–40)
BASOPHILS # BLD AUTO: 0.05 K/UL (ref 0–0.2)
BASOPHILS NFR BLD: 0.8 % (ref 0–1.9)
BILIRUB SERPL-MCNC: 0.4 MG/DL (ref 0.1–1)
BUN SERPL-MCNC: 7 MG/DL (ref 6–20)
CALCIUM SERPL-MCNC: 9 MG/DL (ref 8.7–10.5)
CHLORIDE SERPL-SCNC: 111 MMOL/L (ref 95–110)
CHOLEST SERPL-MCNC: 277 MG/DL (ref 120–199)
CHOLEST/HDLC SERPL: 6.6 {RATIO} (ref 2–5)
CO2 SERPL-SCNC: 22 MMOL/L (ref 23–29)
CREAT SERPL-MCNC: 0.8 MG/DL (ref 0.5–1.4)
DIFFERENTIAL METHOD: ABNORMAL
EOSINOPHIL # BLD AUTO: 0.1 K/UL (ref 0–0.5)
EOSINOPHIL NFR BLD: 1.1 % (ref 0–8)
ERYTHROCYTE [DISTWIDTH] IN BLOOD BY AUTOMATED COUNT: 11.9 % (ref 11.5–14.5)
EST. GFR  (NO RACE VARIABLE): >60 ML/MIN/1.73 M^2
GLUCOSE SERPL-MCNC: 114 MG/DL (ref 70–110)
HCT VFR BLD AUTO: 42.2 % (ref 37–48.5)
HDLC SERPL-MCNC: 42 MG/DL (ref 40–75)
HDLC SERPL: 15.2 % (ref 20–50)
HGB BLD-MCNC: 14.5 G/DL (ref 12–16)
IMM GRANULOCYTES # BLD AUTO: 0.01 K/UL (ref 0–0.04)
IMM GRANULOCYTES NFR BLD AUTO: 0.2 % (ref 0–0.5)
LDLC SERPL CALC-MCNC: 213.6 MG/DL (ref 63–159)
LYMPHOCYTES # BLD AUTO: 2.6 K/UL (ref 1–4.8)
LYMPHOCYTES NFR BLD: 41.7 % (ref 18–48)
MCH RBC QN AUTO: 31.6 PG (ref 27–31)
MCHC RBC AUTO-ENTMCNC: 34.4 G/DL (ref 32–36)
MCV RBC AUTO: 92 FL (ref 82–98)
MONOCYTES # BLD AUTO: 0.3 K/UL (ref 0.3–1)
MONOCYTES NFR BLD: 4 % (ref 4–15)
NEUTROPHILS # BLD AUTO: 3.2 K/UL (ref 1.8–7.7)
NEUTROPHILS NFR BLD: 52.2 % (ref 38–73)
NONHDLC SERPL-MCNC: 235 MG/DL
NRBC BLD-RTO: 0 /100 WBC
PLATELET # BLD AUTO: 198 K/UL (ref 150–450)
PMV BLD AUTO: 12.3 FL (ref 9.2–12.9)
POTASSIUM SERPL-SCNC: 4 MMOL/L (ref 3.5–5.1)
PROT SERPL-MCNC: 7.1 G/DL (ref 6–8.4)
RBC # BLD AUTO: 4.59 M/UL (ref 4–5.4)
SODIUM SERPL-SCNC: 142 MMOL/L (ref 136–145)
TRIGL SERPL-MCNC: 107 MG/DL (ref 30–150)
WBC # BLD AUTO: 6.19 K/UL (ref 3.9–12.7)

## 2023-04-25 PROCEDURE — 3008F PR BODY MASS INDEX (BMI) DOCUMENTED: ICD-10-PCS | Mod: CPTII,S$GLB,, | Performed by: PHYSICIAN ASSISTANT

## 2023-04-25 PROCEDURE — 3008F BODY MASS INDEX DOCD: CPT | Mod: CPTII,S$GLB,, | Performed by: PHYSICIAN ASSISTANT

## 2023-04-25 PROCEDURE — 3080F DIAST BP >= 90 MM HG: CPT | Mod: CPTII,S$GLB,, | Performed by: PHYSICIAN ASSISTANT

## 2023-04-25 PROCEDURE — 87389 HIV-1 AG W/HIV-1&-2 AB AG IA: CPT | Performed by: PHYSICIAN ASSISTANT

## 2023-04-25 PROCEDURE — 36415 PR COLLECTION VENOUS BLOOD,VENIPUNCTURE: ICD-10-PCS | Mod: ,,, | Performed by: STUDENT IN AN ORGANIZED HEALTH CARE EDUCATION/TRAINING PROGRAM

## 2023-04-25 PROCEDURE — 3080F PR MOST RECENT DIASTOLIC BLOOD PRESSURE >= 90 MM HG: ICD-10-PCS | Mod: CPTII,S$GLB,, | Performed by: PHYSICIAN ASSISTANT

## 2023-04-25 PROCEDURE — 84443 ASSAY THYROID STIM HORMONE: CPT | Performed by: PHYSICIAN ASSISTANT

## 2023-04-25 PROCEDURE — 80061 LIPID PANEL: CPT | Performed by: PHYSICIAN ASSISTANT

## 2023-04-25 PROCEDURE — 36415 COLL VENOUS BLD VENIPUNCTURE: CPT | Mod: ,,, | Performed by: STUDENT IN AN ORGANIZED HEALTH CARE EDUCATION/TRAINING PROGRAM

## 2023-04-25 PROCEDURE — 1159F MED LIST DOCD IN RCRD: CPT | Mod: CPTII,S$GLB,, | Performed by: PHYSICIAN ASSISTANT

## 2023-04-25 PROCEDURE — 80053 COMPREHEN METABOLIC PANEL: CPT | Performed by: PHYSICIAN ASSISTANT

## 2023-04-25 PROCEDURE — 3077F PR MOST RECENT SYSTOLIC BLOOD PRESSURE >= 140 MM HG: ICD-10-PCS | Mod: CPTII,S$GLB,, | Performed by: PHYSICIAN ASSISTANT

## 2023-04-25 PROCEDURE — 99214 PR OFFICE/OUTPT VISIT, EST, LEVL IV, 30-39 MIN: ICD-10-PCS | Mod: S$GLB,,, | Performed by: PHYSICIAN ASSISTANT

## 2023-04-25 PROCEDURE — 82607 VITAMIN B-12: CPT | Performed by: PHYSICIAN ASSISTANT

## 2023-04-25 PROCEDURE — 85025 COMPLETE CBC W/AUTO DIFF WBC: CPT | Performed by: PHYSICIAN ASSISTANT

## 2023-04-25 PROCEDURE — 86803 HEPATITIS C AB TEST: CPT | Performed by: PHYSICIAN ASSISTANT

## 2023-04-25 PROCEDURE — 99214 OFFICE O/P EST MOD 30 MIN: CPT | Mod: S$GLB,,, | Performed by: PHYSICIAN ASSISTANT

## 2023-04-25 PROCEDURE — 3077F SYST BP >= 140 MM HG: CPT | Mod: CPTII,S$GLB,, | Performed by: PHYSICIAN ASSISTANT

## 2023-04-25 PROCEDURE — 1159F PR MEDICATION LIST DOCUMENTED IN MEDICAL RECORD: ICD-10-PCS | Mod: CPTII,S$GLB,, | Performed by: PHYSICIAN ASSISTANT

## 2023-04-25 RX ORDER — PREDNISONE 10 MG/1
TABLET ORAL
Qty: 18 TABLET | Refills: 0 | Status: SHIPPED | OUTPATIENT
Start: 2023-04-25 | End: 2023-05-17 | Stop reason: SDUPTHER

## 2023-04-25 RX ORDER — BUTALBITAL, ACETAMINOPHEN AND CAFFEINE 50; 325; 40 MG/1; MG/1; MG/1
1 TABLET ORAL EVERY 6 HOURS PRN
Qty: 40 TABLET | Refills: 3 | Status: SHIPPED | OUTPATIENT
Start: 2023-04-25 | End: 2023-07-25 | Stop reason: SDUPTHER

## 2023-04-25 RX ORDER — HYDROCODONE BITARTRATE AND ACETAMINOPHEN 10; 325 MG/1; MG/1
1 TABLET ORAL EVERY 6 HOURS PRN
Qty: 21 TABLET | Refills: 0 | Status: SHIPPED | OUTPATIENT
Start: 2023-04-25 | End: 2023-05-17 | Stop reason: SDUPTHER

## 2023-04-25 RX ORDER — HYDROXYZINE HYDROCHLORIDE 50 MG/1
50 TABLET, FILM COATED ORAL 3 TIMES DAILY
Qty: 270 TABLET | Refills: 3 | Status: SHIPPED | OUTPATIENT
Start: 2023-04-25 | End: 2023-07-25 | Stop reason: SDUPTHER

## 2023-04-25 RX ORDER — ERGOCALCIFEROL 1.25 MG/1
50000 CAPSULE ORAL
Qty: 12 CAPSULE | Refills: 3 | Status: SHIPPED | OUTPATIENT
Start: 2023-04-25 | End: 2024-01-19 | Stop reason: SDUPTHER

## 2023-04-25 RX ORDER — PROPRANOLOL HYDROCHLORIDE 20 MG/1
20 TABLET ORAL 2 TIMES DAILY
Qty: 180 TABLET | Refills: 3 | Status: SHIPPED | OUTPATIENT
Start: 2023-04-25 | End: 2023-07-25 | Stop reason: SDUPTHER

## 2023-04-25 RX ORDER — PROMETHAZINE HYDROCHLORIDE 25 MG/1
25 TABLET ORAL EVERY 6 HOURS PRN
Qty: 30 TABLET | Refills: 2 | OUTPATIENT
Start: 2023-04-25 | End: 2023-05-17

## 2023-04-25 RX ORDER — CYANOCOBALAMIN 1000 UG/ML
1000 INJECTION, SOLUTION INTRAMUSCULAR; SUBCUTANEOUS
Qty: 10 ML | Refills: 0 | Status: SHIPPED | OUTPATIENT
Start: 2023-04-25 | End: 2024-01-19 | Stop reason: SDUPTHER

## 2023-04-25 RX ORDER — ALBUTEROL SULFATE 90 UG/1
2 AEROSOL, METERED RESPIRATORY (INHALATION) EVERY 6 HOURS PRN
Qty: 18 G | Refills: 0 | Status: SHIPPED | OUTPATIENT
Start: 2023-04-25 | End: 2023-09-06

## 2023-04-25 RX ORDER — AZITHROMYCIN 250 MG/1
TABLET, FILM COATED ORAL
Qty: 6 TABLET | Refills: 0 | Status: SHIPPED | OUTPATIENT
Start: 2023-04-25 | End: 2023-08-01

## 2023-04-25 RX ORDER — DIAZEPAM 10 MG/1
10 TABLET ORAL 2 TIMES DAILY PRN
Qty: 60 TABLET | Refills: 2 | Status: SHIPPED | OUTPATIENT
Start: 2023-04-25 | End: 2023-06-22

## 2023-04-25 RX ORDER — METHOCARBAMOL 500 MG/1
500 TABLET, FILM COATED ORAL 3 TIMES DAILY
Qty: 270 TABLET | Refills: 3 | Status: SHIPPED | OUTPATIENT
Start: 2023-04-25 | End: 2024-01-19 | Stop reason: SDUPTHER

## 2023-04-25 RX ORDER — BUDESONIDE AND FORMOTEROL FUMARATE DIHYDRATE 160; 4.5 UG/1; UG/1
2 AEROSOL RESPIRATORY (INHALATION) EVERY 12 HOURS
Qty: 10.2 G | Refills: 5 | Status: SHIPPED | OUTPATIENT
Start: 2023-04-25 | End: 2023-06-12

## 2023-04-25 NOTE — PROGRESS NOTES
Subjective     Patient ID: Wanda Hirsch is a 48 y.o. female.    Chief Complaint: No chief complaint on file.    Patient is a 49 yo female who comes in today for a check up and refills.  She continues to have ongoing pelvic pain. She has plans on getting the CT I ordered.     Patient Active Problem List:     Hematuria     Systemic lupus erythematosus     COPD (chronic obstructive pulmonary disease)     Tobacco abuse     Flank pain     B12 deficiency ( has been taking 1 cc Weekly)     Vitamin D deficiency disease     Hypertension     Anxiety     Intractable migraine without status migrainosus     She has not found a Rheumatologist that will take her insurance.     Past Medical History:  No date: Anxiety  No date: Arthritis  No date: COPD (chronic obstructive pulmonary disease)  No date: COVID-19      Comment:  sept 2021  No date: IBS (irritable bowel syndrome)  No date: Interstitial cystitis (chronic) with hematuria      Comment:  per patient  No date: Lupus      Comment:  per patient  No date: Migraine headache  No date: Mixed connective tissue disease      Comment:  per patient  No date: Nephrolithiasis      Review of Systems   Constitutional:  Negative for activity change, chills, fatigue and unexpected weight change.   HENT: Negative.  Negative for nasal congestion, postnasal drip and sinus pressure/congestion.    Eyes: Negative.    Respiratory:  Negative for cough, chest tightness, shortness of breath and wheezing.    Cardiovascular:  Negative for palpitations, leg swelling and claudication.   Gastrointestinal:  Positive for abdominal pain. Negative for blood in stool, diarrhea and nausea.   Endocrine: Negative.    Genitourinary: Negative.    Musculoskeletal: Negative.    Integumentary:  Negative.   Neurological:  Negative for dizziness, seizures and headaches.   Psychiatric/Behavioral: Negative.          Objective     Physical Exam  Vitals (BP is elevated due to being off BP medication) reviewed.    Constitutional:       General: She is not in acute distress.     Appearance: Normal appearance. She is not ill-appearing, toxic-appearing or diaphoretic.   Eyes:      Conjunctiva/sclera: Conjunctivae normal.   Neck:      Vascular: No carotid bruit.   Cardiovascular:      Rate and Rhythm: Normal rate and regular rhythm.      Pulses: Normal pulses.      Heart sounds: Normal heart sounds. No murmur heard.    No friction rub. No gallop.   Pulmonary:      Effort: Pulmonary effort is normal. No respiratory distress.      Breath sounds: Normal breath sounds. No stridor. No wheezing, rhonchi or rales.   Chest:      Chest wall: No tenderness.   Abdominal:      General: Bowel sounds are normal. There is no distension.      Palpations: Abdomen is soft.      Tenderness: There is abdominal tenderness.       Musculoskeletal:         General: No swelling, tenderness, deformity or signs of injury.      Cervical back: No rigidity or tenderness.      Right lower leg: No edema.      Left lower leg: No edema.   Lymphadenopathy:      Cervical: Cervical adenopathy present.   Skin:     Coloration: Skin is not jaundiced or pale.      Findings: No bruising, erythema, lesion or rash.   Neurological:      Mental Status: She is alert.          Assessment and Plan     Problem List Items Addressed This Visit       Vitamin D deficiency disease    Relevant Medications    ergocalciferol (ERGOCALCIFEROL) 50,000 unit Cap    Systemic lupus erythematosus - Primary    Relevant Medications    methocarbamoL (ROBAXIN) 500 MG Tab    HYDROcodone-acetaminophen (NORCO)  mg per tablet    Other Relevant Orders    Comprehensive Metabolic Panel    Intractable migraine without status migrainosus    Relevant Medications    propranoloL (INDERAL) 20 MG tablet    promethazine (PHENERGAN) 25 MG tablet    butalbital-acetaminophen-caffeine -40 mg (FIORICET, ESGIC) -40 mg per tablet    Hypertension    COPD (chronic obstructive pulmonary disease)     Relevant Medications    budesonide-formoterol 160-4.5 mcg (SYMBICORT) 160-4.5 mcg/actuation HFAA    albuterol (VENTOLIN HFA) 90 mcg/actuation inhaler    B12 deficiency    Relevant Medications    cyanocobalamin 1,000 mcg/mL injection    Other Relevant Orders    Vitamin B12    Anxiety    Relevant Medications    hydrOXYzine (ATARAX) 50 MG tablet    diazePAM (VALIUM) 10 MG Tab    Other Relevant Orders    TSH    CBC Auto Differential     Other Visit Diagnoses       Wellness examination        Relevant Orders    Lipid Panel    HIV 1/2 Ag/Ab (4th Gen)    Hepatitis C Antibody            Systemic lupus erythematosus, unspecified SLE type, unspecified organ involvement status  -     methocarbamoL (ROBAXIN) 500 MG Tab; Take 1 tablet (500 mg total) by mouth 3 (three) times daily.  Dispense: 270 tablet; Refill: 3  -     HYDROcodone-acetaminophen (NORCO)  mg per tablet; Take 1 tablet by mouth every 6 (six) hours as needed for Pain.  Dispense: 21 tablet; Refill: 0  -     Comprehensive Metabolic Panel; Future; Expected date: 04/25/2023    B12 deficiency  -     cyanocobalamin 1,000 mcg/mL injection; Inject 1 mL (1,000 mcg total) into the muscle every 30 days.  Dispense: 10 mL; Refill: 0  -     Vitamin B12; Future; Expected date: 04/25/2023    Chronic obstructive pulmonary disease, unspecified COPD type  -     budesonide-formoterol 160-4.5 mcg (SYMBICORT) 160-4.5 mcg/actuation HFAA; Inhale 2 puffs into the lungs every 12 (twelve) hours. Controller  Dispense: 10.2 g; Refill: 5  -     albuterol (VENTOLIN HFA) 90 mcg/actuation inhaler; Inhale 2 puffs into the lungs every 6 (six) hours as needed for Wheezing. Rescue  Dispense: 18 g; Refill: 0    Vitamin D deficiency disease  -     ergocalciferol (ERGOCALCIFEROL) 50,000 unit Cap; Take 1 capsule (50,000 Units total) by mouth every 7 days.  Dispense: 12 capsule; Refill: 3    Wellness examination  -     Lipid Panel; Future; Expected date: 04/25/2023  -     HIV 1/2 Ag/Ab (4th Gen);  Future; Expected date: 04/25/2023  -     Hepatitis C Antibody; Future; Expected date: 04/25/2023    Hypertension, unspecified type    Anxiety  -     hydrOXYzine (ATARAX) 50 MG tablet; Take 1 tablet (50 mg total) by mouth 3 (three) times daily.  Dispense: 270 tablet; Refill: 3  -     diazePAM (VALIUM) 10 MG Tab; Take 1 tablet (10 mg total) by mouth 2 (two) times daily as needed (anxiety).  Dispense: 60 tablet; Refill: 2  -     TSH; Future; Expected date: 04/25/2023  -     CBC Auto Differential; Future; Expected date: 04/25/2023    Other migraine without status migrainosus, intractable  -     propranoloL (INDERAL) 20 MG tablet; Take 1 tablet (20 mg total) by mouth 2 (two) times daily.  Dispense: 180 tablet; Refill: 3  -     promethazine (PHENERGAN) 25 MG tablet; Take 1 tablet (25 mg total) by mouth every 6 (six) hours as needed for Nausea.  Dispense: 30 tablet; Refill: 2  -     butalbital-acetaminophen-caffeine -40 mg (FIORICET, ESGIC) -40 mg per tablet; Take 1 tablet by mouth every 6 (six) hours as needed for Headaches.  Dispense: 40 tablet; Refill: 3      I spent 30 minutes on this encounter, time includes face-to-face, chart review, documentation, test review and orders.

## 2023-04-25 NOTE — TELEPHONE ENCOUNTER
----- Message from Tito Dias sent at 4/25/2023  2:26 PM CDT -----  Contact: self  Type: Needs Medical Advice  Who Called:  Patient    Pharmacy name and phone #:        [facility] - [place], [place] - [address]  [address] [facility] [address]  Phone: [phone] Fax: [phone]      Best Call Back Number: [phone]    Additional Information: Pt states she need to speak with office regarding medication prednisone need prescription sent over to pharm.Please call back

## 2023-04-25 NOTE — TELEPHONE ENCOUNTER
Patient states she thought prednisone was going to be called into [facility] due to her lymph nodes being swollen, but rx was not called in at visit. Please advise.

## 2023-04-26 LAB
HCV AB SERPL QL IA: NORMAL
HIV 1+2 AB+HIV1 P24 AG SERPL QL IA: NORMAL
TSH SERPL DL<=0.005 MIU/L-ACNC: 1.87 UIU/ML (ref 0.4–4)
VIT B12 SERPL-MCNC: 504 PG/ML (ref 210–950)

## 2023-04-27 RX ORDER — ATORVASTATIN CALCIUM 20 MG/1
20 TABLET, FILM COATED ORAL DAILY
Qty: 90 TABLET | Refills: 3 | Status: SHIPPED | OUTPATIENT
Start: 2023-04-27 | End: 2023-09-21

## 2023-05-04 ENCOUNTER — HOSPITAL ENCOUNTER (OUTPATIENT)
Dept: RADIOLOGY | Facility: HOSPITAL | Age: 48
Discharge: HOME | End: 2023-05-04
Attending: PHYSICIAN ASSISTANT
Payer: MEDICAID

## 2023-05-04 DIAGNOSIS — I77.1 SUBCLAVIAN ARTERIAL STENOSIS: Primary | ICD-10-CM

## 2023-05-04 DIAGNOSIS — K76.0 FATTY LIVER: ICD-10-CM

## 2023-05-04 DIAGNOSIS — R07.89 LEFT-SIDED CHEST WALL PAIN: ICD-10-CM

## 2023-05-04 DIAGNOSIS — I77.1 STENOSIS OF ILIAC ARTERY, UNSPECIFIED LATERALITY: ICD-10-CM

## 2023-05-04 DIAGNOSIS — R10.31 RIGHT LOWER QUADRANT PAIN: ICD-10-CM

## 2023-05-04 PROCEDURE — 71260 CT THORAX DX C+: CPT | Mod: 26,,, | Performed by: RADIOLOGY

## 2023-05-04 PROCEDURE — 71260 CT THORAX DX C+: CPT | Mod: TC,PO

## 2023-05-04 PROCEDURE — 74177 CT CHEST ABDOMEN PELVIS WITH CONTRAST (XPD): ICD-10-PCS | Mod: 26,,, | Performed by: RADIOLOGY

## 2023-05-04 PROCEDURE — 25500020 PHARM REV CODE 255: Mod: PO | Performed by: PHYSICIAN ASSISTANT

## 2023-05-04 PROCEDURE — 71260 CT CHEST ABDOMEN PELVIS WITH CONTRAST (XPD): ICD-10-PCS | Mod: 26,,, | Performed by: RADIOLOGY

## 2023-05-04 PROCEDURE — 74177 CT ABD & PELVIS W/CONTRAST: CPT | Mod: 26,,, | Performed by: RADIOLOGY

## 2023-05-04 PROCEDURE — A9698 NON-RAD CONTRAST MATERIALNOC: HCPCS | Mod: PO | Performed by: PHYSICIAN ASSISTANT

## 2023-05-04 PROCEDURE — 74177 CT ABD & PELVIS W/CONTRAST: CPT | Mod: TC,PO

## 2023-05-04 RX ADMIN — IOHEXOL 75 ML: 350 INJECTION, SOLUTION INTRAVENOUS at 01:05

## 2023-05-04 RX ADMIN — BARIUM SULFATE 900 ML: 20 SUSPENSION ORAL at 02:05

## 2023-05-05 ENCOUNTER — TELEPHONE (OUTPATIENT)
Dept: PRIMARY CARE CLINIC | Facility: CLINIC | Age: 48
End: 2023-05-05
Payer: MEDICAID

## 2023-05-05 NOTE — TELEPHONE ENCOUNTER
Pt aware of CT result,hepatology referral scheduled. Pt states she will reach out to DR. Rashida MD office to schedule Cardiology referral. No other concerns were voiced

## 2023-05-05 NOTE — TELEPHONE ENCOUNTER
----- Message from Adria Muse III, PA-C sent at 5/4/2023  4:28 PM CDT -----  Wanda,     I reviewed your CT. 1) it appears you have significant blocked arteries in the upper and lower ext. We need to get you in to see Cardiology to have this looked at closer.   2) The liver has extensive fatty infiltration. We need to get you to see hepatology.

## 2023-05-08 ENCOUNTER — NURSE TRIAGE (OUTPATIENT)
Dept: ADMINISTRATIVE | Facility: CLINIC | Age: 48
End: 2023-05-08
Payer: MEDICAID

## 2023-05-08 NOTE — TELEPHONE ENCOUNTER
Patient states c/o SOB and Chest Pain > 5 minutes with a history of high blood pressure. Patient states she is currently in [place] visiting her daughter.    Patient advised to Call EMS/911 for immediate medical attention. Patient states understanding of care advice and cites no additional concerns at this time.     Reason for Disposition   Chest pain lasting longer than 5 minutes and ANY of the following:* Over 44 years old* Over 30 years old and at least one cardiac risk factor (e.g., diabetes mellitus, high blood pressure, high cholesterol, smoker, or strong family history of heart disease)* History of heart disease (i.e., angina, heart attack, heart failure, bypass surgery, takes nitroglycerin)* Pain is crushing, pressure-like, or heavy    Additional Information   Negative: SEVERE difficulty breathing (e.g., struggling for each breath, speaks in single words, pulse > 120)   Negative: Breathing stopped and hasn't returned   Negative: Choking on something   Negative: Bluish (or gray) lips or face   Negative: Difficult to awaken or acting confused (e.g., disoriented, slurred speech)   Negative: Passed out (i.e., fainted, collapsed and was not responding)   Negative: Wheezing started suddenly after medicine, an allergic food, or bee sting   Negative: Stridor   Negative: Slow, shallow and weak breathing   Negative: Sounds like a life-threatening emergency to the triager   Chest pain   Negative: SEVERE difficulty breathing (e.g., struggling for each breath, speaks in single words)   Negative: Passed out (i.e., fainted, collapsed and was not responding)   Negative: Difficult to awaken or acting confused (e.g., disoriented, slurred speech)   Negative: Shock suspected (e.g., cold/pale/clammy skin, too weak to stand, low BP, rapid pulse)    Protocols used: Breathing Difficulty-A-OH, Chest Pain-A-OH

## 2023-05-15 ENCOUNTER — TELEPHONE (OUTPATIENT)
Dept: PRIMARY CARE CLINIC | Facility: CLINIC | Age: 48
End: 2023-05-15
Payer: MEDICAID

## 2023-05-15 NOTE — TELEPHONE ENCOUNTER
----- Message from Wendy Thompson, Patient Care Assistant sent at 5/15/2023  1:59 PM CDT -----  Regarding: appointment  Contact: pt  Type:  Sooner Appointment Request    Caller is requesting a sooner appointment.  Caller declined first available appointment listed below.  Caller will not accept being placed on the waitlist and is requesting a message be sent to doctor.    Name of Caller:  pt     When is the first available appointment?  A week     Symptoms:  pain nausea and vomiting     Best Call Back Number: [phone] (home)     Additional Information:  please call pt to advise. Thanks!

## 2023-05-16 ENCOUNTER — OFFICE VISIT (OUTPATIENT)
Dept: PRIMARY CARE CLINIC | Facility: CLINIC | Age: 48
End: 2023-05-16
Payer: MEDICAID

## 2023-05-16 VITALS
HEART RATE: 98 BPM | WEIGHT: 169.63 LBS | TEMPERATURE: 99 F | SYSTOLIC BLOOD PRESSURE: 115 MMHG | RESPIRATION RATE: 18 BRPM | BODY MASS INDEX: 33.13 KG/M2 | DIASTOLIC BLOOD PRESSURE: 69 MMHG | OXYGEN SATURATION: 98 %

## 2023-05-16 DIAGNOSIS — R06.02 SHORTNESS OF BREATH: ICD-10-CM

## 2023-05-16 DIAGNOSIS — R10.9 RIGHT SIDED ABDOMINAL PAIN: Primary | ICD-10-CM

## 2023-05-16 PROCEDURE — 99214 PR OFFICE/OUTPT VISIT, EST, LEVL IV, 30-39 MIN: ICD-10-PCS | Mod: S$GLB,,, | Performed by: PHYSICIAN ASSISTANT

## 2023-05-16 PROCEDURE — 3008F PR BODY MASS INDEX (BMI) DOCUMENTED: ICD-10-PCS | Mod: CPTII,S$GLB,, | Performed by: PHYSICIAN ASSISTANT

## 2023-05-16 PROCEDURE — 3078F PR MOST RECENT DIASTOLIC BLOOD PRESSURE < 80 MM HG: ICD-10-PCS | Mod: CPTII,S$GLB,, | Performed by: PHYSICIAN ASSISTANT

## 2023-05-16 PROCEDURE — 99214 OFFICE O/P EST MOD 30 MIN: CPT | Mod: S$GLB,,, | Performed by: PHYSICIAN ASSISTANT

## 2023-05-16 PROCEDURE — 3074F SYST BP LT 130 MM HG: CPT | Mod: CPTII,S$GLB,, | Performed by: PHYSICIAN ASSISTANT

## 2023-05-16 PROCEDURE — 3078F DIAST BP <80 MM HG: CPT | Mod: CPTII,S$GLB,, | Performed by: PHYSICIAN ASSISTANT

## 2023-05-16 PROCEDURE — 3008F BODY MASS INDEX DOCD: CPT | Mod: CPTII,S$GLB,, | Performed by: PHYSICIAN ASSISTANT

## 2023-05-16 PROCEDURE — 3074F PR MOST RECENT SYSTOLIC BLOOD PRESSURE < 130 MM HG: ICD-10-PCS | Mod: CPTII,S$GLB,, | Performed by: PHYSICIAN ASSISTANT

## 2023-05-16 NOTE — PROGRESS NOTES
Patient ID: Wanda Hirsch is a 48 y.o. female.     Chief Complaint: Pain (Right side)     HPI:     Abdominal Pain  47 y/o female seen in clinic with complaints of chronic right-sided abdominal pain that has been present for years but has worsened recently. The pain is primarily located in RUQ and RLQ, is described as severe and unrelenting, and worsens with eating, movement and palpation. Associated symptoms include daily nausea and vomiting. Negative for constipation, diarrhea, and fever. She states it is only relieved by oral narcotics. She recently has CT scan done that showed an enlarged fatty liver. Significant PMH and surgical history listed below.      Shortness of breath  This is a chronic problem that has worsened in the last week. Associated symptoms include wheezing. Denies chest pain, fever, leg swelling, and sore throat. Symptoms are aggravated by laying flat. Risk factors include smoking. She has tried oral steroids in the past that provided minimal relief. She reports significant relief by changing body to upright position. Her PMH is significant for COPD. Recent CT scan showed extensive vascular calcifications with potential hemodynamically significant stenosis in the common iliac arteries and in the right subclavian artery with small caliber external iliac artery seen bilaterally and an incidental finding of a 3mm pulmonary nodule on the right side.     Review of Systems   Constitutional:  Positive for fatigue. Negative for activity change, diaphoresis and fever.   HENT:  Negative for sinus pressure/congestion and sore throat.    Respiratory:  Positive for shortness of breath and wheezing.    Cardiovascular:  Negative for chest pain, palpitations and leg swelling.   Gastrointestinal:  Positive for abdominal pain (RUQ/RLQ), nausea and vomiting. Negative for abdominal distention, blood in stool, constipation and diarrhea.   Endocrine: Negative.    Genitourinary: Negative.    Musculoskeletal:   Positive for leg pain and myalgias. Negative for back pain.   Integumentary:  Negative.   Allergic/Immunologic: Negative.    Neurological: Negative.    Hematological:  Does not bruise/bleed easily.           Past Medical History:   Diagnosis Date    Anxiety      Arthritis      COPD (chronic obstructive pulmonary disease)      COVID-19       2021    IBS (irritable bowel syndrome)      Interstitial cystitis (chronic) with hematuria       per patient    Lupus       per patient    Migraine headache      Mixed connective tissue disease       per patient    Nephrolithiasis                 Past Surgical History:   Procedure Laterality Date    APPENDECTOMY         SECTION       CHOLECYSTECTOMY        Cyst removed from urethra        HYSTERECTOMY   2002    OOPHORECTOMY   2002               Family History   Problem Relation Age of Onset    Heart disease Mother      Lung cancer Mother      Lung cancer Father           Social History            Socioeconomic History    Marital status:    Tobacco Use    Smoking status: Every Day       Packs/day: 1.00       Types: Cigarettes    Smokeless tobacco: Never   Substance and Sexual Activity    Alcohol use: Yes       Comment: Occasionally    Drug use: Never               Review of patient's allergies indicates:   Allergen Reactions    Ketorolac Shortness Of Breath, Anaphylaxis, Hives, Itching and Swelling       Other reaction(s): Other (See Comments), Other (See Comments)  Throat swelling  states throat closes  states throat closes       Levofloxacin Anaphylaxis, Hives, Shortness Of Breath and Swelling       Throat swelling       Metoclopramide Hives, Itching and Swelling       Other reaction(s): Other (See Comments), Other (See Comments)  Throat swelling  States throat closes  States throat closes       Reglan [metoclopramide hcl] Anaphylaxis    Sulfa (sulfonamide antibiotics) Anaphylaxis       Other reaction(s): Other (See Comments), Other (See  Comments)  Childhood allergy (unknown reaction)  Doesn't know               Current Outpatient Medications:     albuterol (VENTOLIN HFA) 90 mcg/actuation inhaler, Inhale 2 puffs into the lungs every 6 (six) hours as needed for Wheezing. Rescue, Disp: 18 g, Rfl: 0    atorvastatin (LIPITOR) 20 MG tablet, Take 1 tablet (20 mg total) by mouth once daily., Disp: 90 tablet, Rfl: 3    azithromycin (Z-MOLLY) 250 MG tablet, Follow instructions on pack., Disp: 6 tablet, Rfl: 0    budesonide-formoterol 160-4.5 mcg (SYMBICORT) 160-4.5 mcg/actuation HFAA, Inhale 2 puffs into the lungs every 12 (twelve) hours. Controller, Disp: 10.2 g, Rfl: 5    butalbital-acetaminophen-caffeine -40 mg (FIORICET, ESGIC) -40 mg per tablet, Take 1 tablet by mouth every 6 (six) hours as needed for Headaches., Disp: 40 tablet, Rfl: 3    cyanocobalamin 1,000 mcg/mL injection, Inject 1 mL (1,000 mcg total) into the muscle every 30 days., Disp: 10 mL, Rfl: 0    diazePAM (VALIUM) 10 MG Tab, Take 1 tablet (10 mg total) by mouth 2 (two) times daily as needed (anxiety)., Disp: 60 tablet, Rfl: 2    ergocalciferol (ERGOCALCIFEROL) 50,000 unit Cap, Take 1 capsule (50,000 Units total) by mouth every 7 days., Disp: 12 capsule, Rfl: 3    HYDROcodone-acetaminophen (NORCO)  mg per tablet, Take 1 tablet by mouth every 6 (six) hours as needed for Pain., Disp: 21 tablet, Rfl: 0    hydrOXYzine (ATARAX) 50 MG tablet, Take 1 tablet (50 mg total) by mouth 3 (three) times daily., Disp: 270 tablet, Rfl: 3    ibuprofen (ADVIL,MOTRIN) 800 MG tablet, Take 800 mg by mouth every 8 (eight) hours as needed for Pain., Disp: , Rfl:     methocarbamoL (ROBAXIN) 500 MG Tab, Take 1 tablet (500 mg total) by mouth 3 (three) times daily., Disp: 270 tablet, Rfl: 3    predniSONE (DELTASONE) 10 MG tablet, Take 3 daily for 3 days, then 2 daily for three days, then 1 daily for three days., Disp: 18 tablet, Rfl: 0    promethazine (PHENERGAN) 25 MG tablet, Take 1 tablet (25 mg  total) by mouth every 6 (six) hours as needed for Nausea., Disp: 30 tablet, Rfl: 2    propranoloL (INDERAL) 20 MG tablet, Take 1 tablet (20 mg total) by mouth 2 (two) times daily., Disp: 180 tablet, Rfl: 3     /69   Pulse 98   Temp 98.7 °F (37.1 °C)   Resp 18   Wt 76.9 kg (169 lb 10.3 oz)   SpO2 98%   BMI 33.13 kg/m²       Objective      Physical Exam  Constitutional:       General: She is in acute distress (visible distress secondary to pain).      Appearance: Normal appearance. She is well-developed. She is not diaphoretic.   Cardiovascular:      Rate and Rhythm: Normal rate and regular rhythm.      Chest Wall: PMI is not displaced.      Pulses:           Radial pulses are 1+ on the right side and 1+ on the left side.        Posterior tibial pulses are 1+ on the right side and 1+ on the left side.      Heart sounds: Normal heart sounds. No murmur heard.  Pulmonary:      Effort: Pulmonary effort is normal.      Breath sounds: Examination of the right-upper field reveals wheezing. Examination of the left-upper field reveals wheezing. Examination of the right-lower field reveals wheezing. Examination of the left-lower field reveals wheezing. Wheezing present.      Comments: Increase in symptoms and effort while laying flat  Chest:      Chest wall: No tenderness.   Abdominal:      General: Bowel sounds are normal. There is no distension.      Palpations: There is hepatomegaly. There is no mass.      Tenderness: There is abdominal tenderness in the right upper quadrant and right lower quadrant. There is guarding.      Hernia: No hernia is present.   Musculoskeletal:         General: Tenderness present. No swelling.      Cervical back: Normal range of motion. Pain with movement present.      Right lower leg: No edema.      Left lower leg: No edema.   Lymphadenopathy:      Cervical: Cervical adenopathy present.   Skin:     Coloration: Skin is not jaundiced.      Findings: No bruising.   Neurological:       General: No focal deficit present.      Mental Status: She is alert and oriented to person, place, and time.   Psychiatric:         Mood and Affect: Mood normal.         Behavior: Behavior normal.      Assessment and Plan:     Right sided abdominal pain  - Informed patient I am unable to give her additional oral narcotics at this time after her request. Due to her unrelenting abdominal pain, advised her to go to the emergency department as soon as possible for further evaluation. Pt agrees to this.  - Advised to followup with Hepatology as scheduled on 6/1/23.      Shortness of breath  - Advised to go to Emergency Department for further evaluation. Recommended to followup with Pulmonology at her earliest convenience.  - Cardiology consult placed            I spent 30 minutes on this encounter, time includes face-to-face, chart review, documentation, test review and orders.

## 2023-05-16 NOTE — PROGRESS NOTES
Subjective     Patient ID: Wanda Hirsch is a 48 y.o. female.    Chief Complaint: Pain (Right side)    HPI:    Abdominal Pain  49 y/o female seen in clinic with complaints of chronic right-sided abdominal pain that has been present for years but has worsened recently. The pain is primarily located in RUQ and RLQ, is described as severe and unrelenting, and worsens with eating, movement and palpation. Associated symptoms include daily nausea and vomiting. Negative for constipation, diarrhea, and fever. She states it is only relieved by oral narcotics. She recently has CT scan done that showed an enlarged fatty liver. Significant PMH and surgical history listed below.     Shortness of breath  This is a chronic problem that has worsened in the last week. Associated symptoms include wheezing. Denies chest pain, fever, leg swelling, and sore throat. Symptoms are aggravated by laying flat. Risk factors include smoking. She has tried oral steroids in the past that provided minimal relief. She reports significant relief by changing body to upright position. Her PMH is significant for COPD. Recent CT scan showed extensive vascular calcifications with potential hemodynamically significant stenosis in the common iliac arteries and in the right subclavian artery with small caliber external iliac artery seen bilaterally and an incidental finding of a 3mm pulmonary nodule on the right side.    Review of Systems   Constitutional:  Positive for fatigue. Negative for activity change, diaphoresis and fever.   HENT:  Negative for sinus pressure/congestion and sore throat.    Respiratory:  Positive for shortness of breath and wheezing.    Cardiovascular:  Negative for chest pain, palpitations and leg swelling.   Gastrointestinal:  Positive for abdominal pain (RUQ/RLQ), nausea and vomiting. Negative for abdominal distention, blood in stool, constipation and diarrhea.   Endocrine: Negative.    Genitourinary: Negative.     Musculoskeletal:  Positive for leg pain and myalgias. Negative for back pain.   Integumentary:  Negative.   Allergic/Immunologic: Negative.    Neurological: Negative.    Hematological:  Does not bruise/bleed easily.     Past Medical History:   Diagnosis Date    Anxiety     Arthritis     COPD (chronic obstructive pulmonary disease)     COVID-19     2021    IBS (irritable bowel syndrome)     Interstitial cystitis (chronic) with hematuria     per patient    Lupus     per patient    Migraine headache     Mixed connective tissue disease     per patient    Nephrolithiasis        Past Surgical History:   Procedure Laterality Date    APPENDECTOMY       SECTION      CHOLECYSTECTOMY      Cyst removed from urethra      HYSTERECTOMY  2002    OOPHORECTOMY  2002       Family History   Problem Relation Age of Onset    Heart disease Mother     Lung cancer Mother     Lung cancer Father        Social History     Socioeconomic History    Marital status:    Tobacco Use    Smoking status: Every Day     Packs/day: 1.00     Types: Cigarettes    Smokeless tobacco: Never   Substance and Sexual Activity    Alcohol use: Yes     Comment: Occasionally    Drug use: Never       Review of patient's allergies indicates:   Allergen Reactions    Ketorolac Shortness Of Breath, Anaphylaxis, Hives, Itching and Swelling     Other reaction(s): Other (See Comments), Other (See Comments)  Throat swelling  states throat closes  states throat closes      Levofloxacin Anaphylaxis, Hives, Shortness Of Breath and Swelling     Throat swelling      Metoclopramide Hives, Itching and Swelling     Other reaction(s): Other (See Comments), Other (See Comments)  Throat swelling  States throat closes  States throat closes      Reglan [metoclopramide hcl] Anaphylaxis    Sulfa (sulfonamide antibiotics) Anaphylaxis     Other reaction(s): Other (See Comments), Other (See Comments)  Childhood allergy (unknown reaction)  Doesn't know            Current Outpatient Medications:     albuterol (VENTOLIN HFA) 90 mcg/actuation inhaler, Inhale 2 puffs into the lungs every 6 (six) hours as needed for Wheezing. Rescue, Disp: 18 g, Rfl: 0    atorvastatin (LIPITOR) 20 MG tablet, Take 1 tablet (20 mg total) by mouth once daily., Disp: 90 tablet, Rfl: 3    azithromycin (Z-MOLLY) 250 MG tablet, Follow instructions on pack., Disp: 6 tablet, Rfl: 0    budesonide-formoterol 160-4.5 mcg (SYMBICORT) 160-4.5 mcg/actuation HFAA, Inhale 2 puffs into the lungs every 12 (twelve) hours. Controller, Disp: 10.2 g, Rfl: 5    butalbital-acetaminophen-caffeine -40 mg (FIORICET, ESGIC) -40 mg per tablet, Take 1 tablet by mouth every 6 (six) hours as needed for Headaches., Disp: 40 tablet, Rfl: 3    cyanocobalamin 1,000 mcg/mL injection, Inject 1 mL (1,000 mcg total) into the muscle every 30 days., Disp: 10 mL, Rfl: 0    diazePAM (VALIUM) 10 MG Tab, Take 1 tablet (10 mg total) by mouth 2 (two) times daily as needed (anxiety)., Disp: 60 tablet, Rfl: 2    ergocalciferol (ERGOCALCIFEROL) 50,000 unit Cap, Take 1 capsule (50,000 Units total) by mouth every 7 days., Disp: 12 capsule, Rfl: 3    HYDROcodone-acetaminophen (NORCO)  mg per tablet, Take 1 tablet by mouth every 6 (six) hours as needed for Pain., Disp: 21 tablet, Rfl: 0    hydrOXYzine (ATARAX) 50 MG tablet, Take 1 tablet (50 mg total) by mouth 3 (three) times daily., Disp: 270 tablet, Rfl: 3    ibuprofen (ADVIL,MOTRIN) 800 MG tablet, Take 800 mg by mouth every 8 (eight) hours as needed for Pain., Disp: , Rfl:     methocarbamoL (ROBAXIN) 500 MG Tab, Take 1 tablet (500 mg total) by mouth 3 (three) times daily., Disp: 270 tablet, Rfl: 3    predniSONE (DELTASONE) 10 MG tablet, Take 3 daily for 3 days, then 2 daily for three days, then 1 daily for three days., Disp: 18 tablet, Rfl: 0    promethazine (PHENERGAN) 25 MG tablet, Take 1 tablet (25 mg total) by mouth every 6 (six) hours as needed for Nausea., Disp: 30  tablet, Rfl: 2    propranoloL (INDERAL) 20 MG tablet, Take 1 tablet (20 mg total) by mouth 2 (two) times daily., Disp: 180 tablet, Rfl: 3    /69   Pulse 98   Temp 98.7 °F (37.1 °C)   Resp 18   Wt 76.9 kg (169 lb 10.3 oz)   SpO2 98%   BMI 33.13 kg/m²       Objective     Physical Exam  Constitutional:       General: She is in acute distress (visible distress secondary to pain).      Appearance: Normal appearance. She is well-developed. She is not diaphoretic.   Cardiovascular:      Rate and Rhythm: Normal rate and regular rhythm.      Chest Wall: PMI is not displaced.      Pulses:           Radial pulses are 1+ on the right side and 1+ on the left side.        Posterior tibial pulses are 1+ on the right side and 1+ on the left side.      Heart sounds: Normal heart sounds. No murmur heard.  Pulmonary:      Effort: Pulmonary effort is normal.      Breath sounds: Examination of the right-upper field reveals wheezing. Examination of the left-upper field reveals wheezing. Examination of the right-lower field reveals wheezing. Examination of the left-lower field reveals wheezing. Wheezing present.      Comments: Increase in symptoms and effort while laying flat  Chest:      Chest wall: No tenderness.   Abdominal:      General: Bowel sounds are normal. There is no distension.      Palpations: There is hepatomegaly. There is no mass.      Tenderness: There is abdominal tenderness in the right upper quadrant and right lower quadrant. There is guarding.      Hernia: No hernia is present.   Musculoskeletal:         General: Tenderness present. No swelling.      Cervical back: Normal range of motion. Pain with movement present.      Right lower leg: No edema.      Left lower leg: No edema.   Lymphadenopathy:      Cervical: Cervical adenopathy present.   Skin:     Coloration: Skin is not jaundiced.      Findings: No bruising.   Neurological:      General: No focal deficit present.      Mental Status: She is alert and  oriented to person, place, and time.   Psychiatric:         Mood and Affect: Mood normal.         Behavior: Behavior normal.     Assessment and Plan:    Right sided abdominal pain  - Informed patient I am unable to give her additional oral narcotics at this time after her request. Due to her unrelenting abdominal pain, advised her to go to the emergency department as soon as possible for further evaluation. Pt agrees to this.  - Advised to followup with Hepatology as scheduled on 6/1/23.     Shortness of breath  - Advised to go to Emergency Department for further evaluation. Recommended to followup with Pulmonology at her earliest convenience.  - Cardiology consult placed       I spent 30 minutes on this encounter, time includes face-to-face, chart review, documentation, test review and orders.

## 2023-05-17 PROBLEM — R06.02 SOB (SHORTNESS OF BREATH): Status: ACTIVE | Noted: 2023-05-17

## 2023-05-17 PROBLEM — E55.9 VITAMIN D DEFICIENCY DISEASE: Status: RESOLVED | Noted: 2023-04-25 | Resolved: 2023-05-17

## 2023-05-17 PROBLEM — R10.9 FLANK PAIN: Status: RESOLVED | Noted: 2021-11-14 | Resolved: 2023-05-17

## 2023-05-17 PROBLEM — R10.9 ABDOMINAL PAIN: Status: ACTIVE | Noted: 2023-05-17

## 2023-05-17 PROBLEM — R31.9 HEMATURIA: Status: RESOLVED | Noted: 2021-11-13 | Resolved: 2023-05-17

## 2023-05-17 PROBLEM — E53.8 B12 DEFICIENCY: Status: RESOLVED | Noted: 2023-04-25 | Resolved: 2023-05-17

## 2023-05-17 PROBLEM — J44.1 COPD WITH ACUTE EXACERBATION: Status: ACTIVE | Noted: 2021-11-13

## 2023-05-22 ENCOUNTER — PATIENT MESSAGE (OUTPATIENT)
Dept: PRIMARY CARE CLINIC | Facility: CLINIC | Age: 48
End: 2023-05-22
Payer: MEDICAID

## 2023-06-01 ENCOUNTER — LAB VISIT (OUTPATIENT)
Dept: LAB | Facility: HOSPITAL | Age: 48
End: 2023-06-01
Attending: NURSE PRACTITIONER
Payer: MEDICAID

## 2023-06-01 ENCOUNTER — OFFICE VISIT (OUTPATIENT)
Dept: HEPATOLOGY | Facility: CLINIC | Age: 48
End: 2023-06-01
Payer: MEDICAID

## 2023-06-01 VITALS — BODY MASS INDEX: 31.56 KG/M2 | WEIGHT: 161.63 LBS

## 2023-06-01 DIAGNOSIS — R74.8 ELEVATED LIVER ENZYMES: ICD-10-CM

## 2023-06-01 DIAGNOSIS — E78.5 HYPERLIPIDEMIA, UNSPECIFIED HYPERLIPIDEMIA TYPE: ICD-10-CM

## 2023-06-01 DIAGNOSIS — R11.2 NAUSEA AND VOMITING, UNSPECIFIED VOMITING TYPE: ICD-10-CM

## 2023-06-01 DIAGNOSIS — K76.0 FATTY LIVER: ICD-10-CM

## 2023-06-01 DIAGNOSIS — R10.11 RIGHT UPPER QUADRANT ABDOMINAL PAIN: ICD-10-CM

## 2023-06-01 DIAGNOSIS — K76.0 NAFLD (NONALCOHOLIC FATTY LIVER DISEASE): Primary | ICD-10-CM

## 2023-06-01 LAB
CK SERPL-CCNC: 54 U/L (ref 20–180)
ESTIMATED AVG GLUCOSE: 108 MG/DL (ref 68–131)
FERRITIN SERPL-MCNC: 182 NG/ML (ref 20–300)
HAV IGG SER QL IA: NORMAL
HBA1C MFR BLD: 5.4 % (ref 4–5.6)
HBV CORE AB SERPL QL IA: NORMAL
HBV SURFACE AB SER-ACNC: 19.05 MIU/ML
HBV SURFACE AB SER-ACNC: REACTIVE M[IU]/ML
HBV SURFACE AG SERPL QL IA: NORMAL
IGG SERPL-MCNC: 941 MG/DL (ref 650–1600)
IRON SERPL-MCNC: 70 UG/DL (ref 30–160)
SATURATED IRON: 19 % (ref 20–50)
TOTAL IRON BINDING CAPACITY: 374 UG/DL (ref 250–450)
TRANSFERRIN SERPL-MCNC: 253 MG/DL (ref 200–375)

## 2023-06-01 PROCEDURE — 1159F PR MEDICATION LIST DOCUMENTED IN MEDICAL RECORD: ICD-10-PCS | Mod: CPTII,,, | Performed by: NURSE PRACTITIONER

## 2023-06-01 PROCEDURE — 86790 VIRUS ANTIBODY NOS: CPT | Performed by: NURSE PRACTITIONER

## 2023-06-01 PROCEDURE — 36415 COLL VENOUS BLD VENIPUNCTURE: CPT | Mod: PO | Performed by: NURSE PRACTITIONER

## 2023-06-01 PROCEDURE — 3008F BODY MASS INDEX DOCD: CPT | Mod: CPTII,,, | Performed by: NURSE PRACTITIONER

## 2023-06-01 PROCEDURE — 82784 ASSAY IGA/IGD/IGG/IGM EACH: CPT | Performed by: NURSE PRACTITIONER

## 2023-06-01 PROCEDURE — 1160F PR REVIEW ALL MEDS BY PRESCRIBER/CLIN PHARMACIST DOCUMENTED: ICD-10-PCS | Mod: CPTII,,, | Performed by: NURSE PRACTITIONER

## 2023-06-01 PROCEDURE — 99204 OFFICE O/P NEW MOD 45 MIN: CPT | Mod: S$PBB,,, | Performed by: NURSE PRACTITIONER

## 2023-06-01 PROCEDURE — 86706 HEP B SURFACE ANTIBODY: CPT | Performed by: NURSE PRACTITIONER

## 2023-06-01 PROCEDURE — 82550 ASSAY OF CK (CPK): CPT | Performed by: NURSE PRACTITIONER

## 2023-06-01 PROCEDURE — 84466 ASSAY OF TRANSFERRIN: CPT | Performed by: NURSE PRACTITIONER

## 2023-06-01 PROCEDURE — 83516 IMMUNOASSAY NONANTIBODY: CPT | Performed by: NURSE PRACTITIONER

## 2023-06-01 PROCEDURE — 99204 PR OFFICE/OUTPT VISIT, NEW, LEVL IV, 45-59 MIN: ICD-10-PCS | Mod: S$PBB,,, | Performed by: NURSE PRACTITIONER

## 2023-06-01 PROCEDURE — 99213 OFFICE O/P EST LOW 20 MIN: CPT | Mod: PBBFAC,PO | Performed by: NURSE PRACTITIONER

## 2023-06-01 PROCEDURE — 99999 PR PBB SHADOW E&M-EST. PATIENT-LVL III: ICD-10-PCS | Mod: PBBFAC,,, | Performed by: NURSE PRACTITIONER

## 2023-06-01 PROCEDURE — 86704 HEP B CORE ANTIBODY TOTAL: CPT | Performed by: NURSE PRACTITIONER

## 2023-06-01 PROCEDURE — 82728 ASSAY OF FERRITIN: CPT | Performed by: NURSE PRACTITIONER

## 2023-06-01 PROCEDURE — 99999 PR PBB SHADOW E&M-EST. PATIENT-LVL III: CPT | Mod: PBBFAC,,, | Performed by: NURSE PRACTITIONER

## 2023-06-01 PROCEDURE — 1159F MED LIST DOCD IN RCRD: CPT | Mod: CPTII,,, | Performed by: NURSE PRACTITIONER

## 2023-06-01 PROCEDURE — 1160F RVW MEDS BY RX/DR IN RCRD: CPT | Mod: CPTII,,, | Performed by: NURSE PRACTITIONER

## 2023-06-01 PROCEDURE — 83036 HEMOGLOBIN GLYCOSYLATED A1C: CPT | Performed by: NURSE PRACTITIONER

## 2023-06-01 PROCEDURE — 86015 ACTIN ANTIBODY EACH: CPT | Performed by: NURSE PRACTITIONER

## 2023-06-01 PROCEDURE — 3008F PR BODY MASS INDEX (BMI) DOCUMENTED: ICD-10-PCS | Mod: CPTII,,, | Performed by: NURSE PRACTITIONER

## 2023-06-01 PROCEDURE — 87340 HEPATITIS B SURFACE AG IA: CPT | Performed by: NURSE PRACTITIONER

## 2023-06-01 PROCEDURE — 86364 TISS TRNSGLTMNASE EA IG CLAS: CPT | Performed by: NURSE PRACTITIONER

## 2023-06-01 NOTE — PATIENT INSTRUCTIONS
Labs to rule out other causes of liver problems  Fibroscan to assess for liver scarring/fibrosis from fatty liver   Referral to GI for nausea, vomiting, abdominal pain, diarrhea         FATTY LIVER EDUCATION:  There is no FDA approved therapy for non-alcoholic fatty liver disease (NAFLD); therefore, lifestyle changes are important:  1. Ongoing weight loss efforts     *Weight loss of 5% has been shown to reduce fat in the liver  *Weight loss of 7-10% has been shown to improve fatty liver, inflammation from fatty liver, and liver fibrosis (scarring/damage)    2. Decreasing daily calories is recommended for weight loss. Try to limit carbohydrate intake to LESS than 30-45 grams of carbs with a meal and LESS than 5-10 grams of carbs with a snack. Avoid drinking beverages with sugar/carbohydrates. Meeting with a dietician or using one of the weight loss apps below can be helpful to determine individualized calorie goals and add up carbs throughout the day. Look for snacks high in protein, low in carbohydrates/sugar.    3. Exercise as tolerated. Studies have shown that exercise improves fatty liver even without weight loss.     4. Recommend good control of cholesterol and blood sugar levels (as these are risk factors for fatty liver). Cholesterol lowering medications including statins are typically safe and beneficial (even if liver enzymes are elevated).    5. Limit alcohol consumption, no more than 1 serving(s) of alcohol in any day (1 serving is 5 ounces of wine, 12 ounces of beer, or 1.5 ounces of liquor). Do not recommend daily alcohol use or drinking alcohol most days per week.    In some people, fatty liver can progress to steatohepatitis (inflamatory fatty liver). This can cause liver scarring or damage (fibrosis) and possibly to cirrhosis. Cirrhosis increases risk of liver cancer and liver failure. Lifestyle changes now can help to decrease this risk.     Ask about our fatty liver/PROCTOR clinical trials if you have  fibrosis / scar tissue related to fatty liver.        Additional Resources:    Websites with information about fatty liver and inflammation related to fatty liver (PROCTOR): www.nashtruth.com and www.nashactually.com   [facility]: Non-Alcoholic Fatty Liver Disease (NAFLD)    Facebook support group with tips and recipes:   Non-Alcoholic Fatty Liver Disease (NAFLD) Diet & Nutrition Support     Can download airpim Pal or Lose It len to add up your carbohydrates throughout the day.      Try www.Blurtt for recipes.    If interested in seeing a dietician to create a weight loss plan, contact the dietician team at [facility]: [email or web address].  You can also call to schedule a consult with a dietician: [phone]  *Virtual visits are available with one of our dieticians.     If you have diabetes or high blood pressure, you can meet with a Health  through [facility] program. Let us know if you are interested in a referral.     Let us know if you are interested in a referral to [facility].

## 2023-06-01 NOTE — PROGRESS NOTES
[facility] - New Patient     REFERRING PROVIDER: Adria Muse III  PCP: Adria Muse III, PA-C    Chief Complaint: Fatty liver      HISTORY     This is a 48 y.o. female referred for evaluation of fatty liver.    Noted history- SLE and mixed connective tissue disorder. Not currently followed by Rheumatology.      Was first told about having fatty liver in her 20s.    Fatty liver first noted on CT in 2020.  More recent CT 5/4/23 with hepatomegaly (17.5 cm) and steatosis; no liver lesions or biliary dilation; spleen WNL.     Review of labs:  - Transaminases: intermittently elevated since 2020. Most recently, AST only (42)  - Alk phos: WNL  - Synthetic liver function: WNL  - Platelets: WNL    Workup thus far:  Serologies:   Lab Results   Component Value Date    HEPCAB Non-reactive 04/25/2023    CPK 85 11/13/2021       Risk factors for fatty liver:   Weight -- Body mass index is 31.56 kg/m².    Has lost ~10 lb with dietary changes                       Dyslipidemia -- yes  On statin                                Insulin resistance / diabetes -- no HgbA1c for review       Alcohol use -- none her since 20s    Family history:  Possible family h/o liver CA    Meds:  -Rx: atorvastatin  -OTC, herbs/supplements: n/a  No recent medication changes.     No recreational drug use.    No symptoms of hepatic decompensation including jaundice, ascites, cognitive problems to suggest hepatic encephalopathy, or GI bleeding.     She reports feeling unwell for some time. Today, has severe RUQ discomfort, worse with position changes. Reports similar to when she had gallbladder issues years ago. Having N/V/D for weeks. Has not seen GI in years. Recently in the ER with SOB; has COPD and is awaiting a consult with Pulmonary. Also awaiting consults with Cardiology and Rheumatology. Her QOL is low due to multiple chronic conditions.           Past medical history, surgical history, problem list, family history, social  history, allergies: Reviewed and updated in the appropriate section of the electronic medical record.      Current Outpatient Medications   Medication Sig Dispense Refill    albuterol (VENTOLIN HFA) 90 mcg/actuation inhaler Inhale 2 puffs into the lungs every 6 (six) hours as needed for Wheezing. Rescue 18 g 0    atorvastatin (LIPITOR) 20 MG tablet Take 1 tablet (20 mg total) by mouth once daily. 90 tablet 3    azithromycin (Z-MOLLY) 250 MG tablet Follow instructions on pack. 6 tablet 0    budesonide-formoterol 160-4.5 mcg (SYMBICORT) 160-4.5 mcg/actuation HFAA Inhale 2 puffs into the lungs every 12 (twelve) hours. Controller 10.2 g 5    butalbital-acetaminophen-caffeine -40 mg (FIORICET, ESGIC) -40 mg per tablet Take 1 tablet by mouth every 6 (six) hours as needed for Headaches. 40 tablet 3    cyanocobalamin 1,000 mcg/mL injection Inject 1 mL (1,000 mcg total) into the muscle every 30 days. 10 mL 0    diazePAM (VALIUM) 10 MG Tab Take 1 tablet (10 mg total) by mouth 2 (two) times daily as needed (anxiety). 60 tablet 2    ergocalciferol (ERGOCALCIFEROL) 50,000 unit Cap Take 1 capsule (50,000 Units total) by mouth every 7 days. 12 capsule 3    hydrOXYzine (ATARAX) 50 MG tablet Take 1 tablet (50 mg total) by mouth 3 (three) times daily. 270 tablet 3    ibuprofen (ADVIL,MOTRIN) 800 MG tablet Take 800 mg by mouth every 8 (eight) hours as needed for Pain.      methocarbamoL (ROBAXIN) 500 MG Tab Take 1 tablet (500 mg total) by mouth 3 (three) times daily. 270 tablet 3    predniSONE (DELTASONE) 10 MG tablet Take 4 tablets daily and decrease by 1 tablets every other day. Dispense #20. Total days: 8 20 tablet 0    propranoloL (INDERAL) 20 MG tablet Take 1 tablet (20 mg total) by mouth 2 (two) times daily. 180 tablet 3     No current facility-administered medications for this visit.     Medication list reviewed and updated.      Review of Systems - as per HPI  Constitutional: Negative for unexpected weight change.  +fatigue  Respiratory: +shortness of breath.    Cardiovascular: Negative for leg swelling.  Gastrointestinal: Negative for abdominal distention. Negative for melena or hematemesis. +RUQ pain, N/V/D  Musculoskeletal: Negative for myalgias.    Skin: Negative for itching.  Neurological: Negative for confusion or slowed mentation. Negative for tremors.   Hematological: Does not bruise/bleed easily.   Psychiatric: Negative for sleep disturbance.      Physical Exam   Constitutional: Well-nourished. No distress. Alert and oriented.  Eyes: No scleral icterus.   Pulmonary/Chest: No respiratory distress. +KING, cough  Abdominal: No distension, no ascites appreciated.   Extremities: No edema.   Neurological: No tremor or asterixis. Gait normal.  Skin: No jaundice. No spider telangiectasias or palmar erythema.  Psychiatric: Normal affect. Speech, behavior, and thought content normal. Depressed mood.        LABS & DIAGNOSTIC STUDIES     I have personally reviewed pertinent laboratory findings:    Lab Results   Component Value Date    ALT 26 05/16/2023    AST 42 (H) 05/16/2023    ALKPHOS 83 05/16/2023    BILITOT 1.1 05/16/2023    ALBUMIN 4.8 05/16/2023    INR 1.1 11/13/2021       Lab Results   Component Value Date    WBC 6.34 05/16/2023    HGB 14.7 05/16/2023    HCT 42.4 05/16/2023    MCV 92 05/16/2023     05/16/2023       Lab Results   Component Value Date     05/16/2023    K 5.4 (H) 05/16/2023    BUN 13 05/16/2023    CREATININE 0.70 05/16/2023    ESTGFRAFRICA >60 07/31/2022    EGFRNONAA >60 07/31/2022       Lab Results   Component Value Date    HEPCAB Non-reactive 04/25/2023    [id number] Non-reactive 04/25/2023       No results found for: AFP    I have personally reviewed the following result reports:  Abdominal US - 5/4/23      ASSESSMENT & PLAN     48 y.o. female with:    1. NAFLD, elevated liver enzymes  -- Obtain Fibroscan for fibrosis and steatosis staging  -- Her enzymes have been intermittently elevated over  the years, will start serologic workup to rule out other causes of liver disease. Check autoimmune markers given h/o SLE    Fatty liver discussion:  - Reviewed risk of hepatic inflammation and subsequent fibrosis from fatty liver.  - At this time, the only treatment for fatty liver is weight loss and maintaining good control of risk factors (cholesterol and blood sugar).   - Alcohol use is also a risk factor for fatty liver. If no advanced fibrosis, should limit alcohol to max 1 standard drinks/day. Do not recommend daily alcohol use or drinking alcohol most days per week.     2. Body mass index is 31.56 kg/m²., HLD  -- Commended on weight loss success, encouraged to continue these efforts     3. Abdominal pain, N/V/D  -- Discussed that fatty liver should not cause this. Liver is mildly enlarged though this should not account for her other GI symptoms. She has not seen GI in years, recommend f/u. New referral placed. ER precautions reviewed.      Orders Placed This Encounter   Procedures    FibroScan [place] (Vibration Controlled Transient Elastography)    Anti-Smooth Muscle Antibody    IgG    CK    Ferritin    Iron and TIBC    Hepatitis A antibody, IgG    Hepatitis B Core Antibody, Total    Hepatitis B Surface Ab, Qualitative    Hepatitis B Surface Antigen    Hemoglobin A1C    Ambulatory referral/consult to Gastroenterology       *See AVS for patient education and instructions.       Return to clinic same day as Fibroscan to review all results.      Thank you for allowing me to participate in the care of Wanda Blackharriett Cisse, REALP-C  Hepatology        Duration of encounter: 45 min  This includes face to face time and non-face to face time preparing to see the patient (eg, review of tests), obtaining and/or reviewing separately obtained history, documenting clinical information in the electronic or other health record, independently interpreting results and communicating results to the  patient/family/caregiver, or Care coordination.

## 2023-06-05 LAB
GLIADIN PEPTIDE IGA SER-ACNC: 0.8 U/ML
GLIADIN PEPTIDE IGG SER-ACNC: <0.6 U/ML
IGA SERPL-MCNC: 169 MG/DL (ref 70–400)
TTG IGA SER-ACNC: 0.5 U/ML
TTG IGG SER-ACNC: <0.6 U/ML

## 2023-06-07 LAB — SMOOTH MUSCLE AB TITR SER IF: ABNORMAL {TITER}

## 2023-06-21 DIAGNOSIS — F41.9 ANXIETY: ICD-10-CM

## 2023-06-22 RX ORDER — DIAZEPAM 10 MG/1
10 TABLET ORAL 2 TIMES DAILY PRN
Qty: 60 TABLET | Refills: 2 | Status: SHIPPED | OUTPATIENT
Start: 2023-06-22 | End: 2023-07-25 | Stop reason: SDUPTHER

## 2023-07-25 DIAGNOSIS — F41.9 ANXIETY: ICD-10-CM

## 2023-07-25 DIAGNOSIS — G43.819 OTHER MIGRAINE WITHOUT STATUS MIGRAINOSUS, INTRACTABLE: ICD-10-CM

## 2023-07-25 RX ORDER — BUTALBITAL, ACETAMINOPHEN AND CAFFEINE 50; 325; 40 MG/1; MG/1; MG/1
1 TABLET ORAL EVERY 6 HOURS PRN
Qty: 40 TABLET | Refills: 3 | Status: SHIPPED | OUTPATIENT
Start: 2023-07-25 | End: 2023-10-19

## 2023-07-25 RX ORDER — PROPRANOLOL HYDROCHLORIDE 20 MG/1
20 TABLET ORAL 2 TIMES DAILY
Qty: 180 TABLET | Refills: 3 | Status: SHIPPED | OUTPATIENT
Start: 2023-07-25

## 2023-07-25 RX ORDER — DIAZEPAM 10 MG/1
10 TABLET ORAL 2 TIMES DAILY PRN
Qty: 60 TABLET | Refills: 2 | Status: SHIPPED | OUTPATIENT
Start: 2023-07-25 | End: 2023-09-25

## 2023-07-25 RX ORDER — HYDROXYZINE HYDROCHLORIDE 50 MG/1
50 TABLET, FILM COATED ORAL 3 TIMES DAILY
Qty: 270 TABLET | Refills: 3 | Status: SHIPPED | OUTPATIENT
Start: 2023-07-25 | End: 2024-01-19 | Stop reason: SDUPTHER

## 2023-07-25 NOTE — TELEPHONE ENCOUNTER
Pt had family emergency. Appt was rescheduled to tues  Pt wants to know if she can get refills until then    Pt also requesting refill for zofran    Please advise

## 2023-08-01 ENCOUNTER — OFFICE VISIT (OUTPATIENT)
Dept: PRIMARY CARE CLINIC | Facility: CLINIC | Age: 48
End: 2023-08-01

## 2023-08-01 VITALS
BODY MASS INDEX: 33.54 KG/M2 | OXYGEN SATURATION: 96 % | SYSTOLIC BLOOD PRESSURE: 120 MMHG | WEIGHT: 171.75 LBS | RESPIRATION RATE: 18 BRPM | HEART RATE: 78 BPM | DIASTOLIC BLOOD PRESSURE: 80 MMHG | TEMPERATURE: 98 F

## 2023-08-01 DIAGNOSIS — E23.6 EMPTY SELLA SYNDROME: Primary | ICD-10-CM

## 2023-08-01 PROCEDURE — 99214 PR OFFICE/OUTPT VISIT, EST, LEVL IV, 30-39 MIN: ICD-10-PCS | Mod: S$GLB,,, | Performed by: PHYSICIAN ASSISTANT

## 2023-08-01 PROCEDURE — 99214 OFFICE O/P EST MOD 30 MIN: CPT | Mod: S$GLB,,, | Performed by: PHYSICIAN ASSISTANT

## 2023-08-01 RX ORDER — HYDROCODONE BITARTRATE AND ACETAMINOPHEN 10; 325 MG/1; MG/1
1 TABLET ORAL EVERY 6 HOURS PRN
Qty: 20 TABLET | Refills: 0 | Status: SHIPPED | OUTPATIENT
Start: 2023-08-01 | End: 2023-09-19 | Stop reason: SDUPTHER

## 2023-08-01 NOTE — PROGRESS NOTES
Subjective     Patient ID: Wanda Hirsch is a 48 y.o. female.    Chief Complaint: Follow-up    Patient is a 47 yo female who reports hitting her head and passing out. This happened in [place]. She zelalem to the local ER and was given an CT. She report the CT shoed various finding including a compressed anterior pituitary gland.     Pain  This is a chronic problem. The current episode started more than 1 year ago. The problem occurs constantly. The problem has been rapidly worsening. Associated symptoms include headaches, neck pain, numbness, vertigo and weakness. Pertinent negatives include no abdominal pain, chest pain, fatigue or fever. Associated symptoms comments: Report having a lupus flair up. She was recently given prednisone. . Nothing aggravates the symptoms.     Review of Systems   Constitutional:  Negative for activity change, appetite change, fatigue and fever.   HENT: Negative.     Respiratory:  Negative for chest tightness, shortness of breath and wheezing.    Cardiovascular:  Negative for chest pain.   Gastrointestinal:  Negative for abdominal pain.   Musculoskeletal:  Positive for neck pain.   Neurological:  Positive for vertigo, weakness, numbness, headaches and memory loss. Negative for dizziness, facial asymmetry, light-headedness, coordination difficulties and coordination difficulties.          Objective     Physical Exam  Vitals reviewed.   Constitutional:       General: She is not in acute distress.     Appearance: Normal appearance. She is not ill-appearing, toxic-appearing or diaphoretic.   Cardiovascular:      Rate and Rhythm: Normal rate and regular rhythm.      Pulses: Normal pulses.      Heart sounds: Normal heart sounds. No murmur heard.     No friction rub. No gallop.   Pulmonary:      Effort: Pulmonary effort is normal. No respiratory distress.      Breath sounds: Normal breath sounds. No stridor. No wheezing, rhonchi or rales.   Chest:      Chest wall: No tenderness.   Abdominal:       General: There is no distension.      Palpations: Abdomen is soft.      Tenderness: There is no abdominal tenderness.   Musculoskeletal:      Cervical back: Tenderness present.   Neurological:      General: No focal deficit present.      Mental Status: She is alert.   Psychiatric:         Mood and Affect: Mood normal.            Assessment and Plan     1. Empty sella syndrome  -     MRI Brain W WO Contrast; Future; Expected date: 08/01/2023  -     ACTH; Future; Expected date: 08/01/2023  -     LUTEINIZING HORMONE; Future; Expected date: 08/01/2023  -     TSH; Future; Expected date: 08/01/2023    Other orders  -     HYDROcodone-acetaminophen (NORCO)  mg per tablet; Take 1 tablet by mouth every 6 (six) hours as needed for Pain.  Dispense: 20 tablet; Refill: 0      I spent 30 minutes on this encounter, time includes face-to-face, chart review, documentation, test review and orders.           No follow-ups on file.

## 2023-09-01 ENCOUNTER — OFFICE VISIT (OUTPATIENT)
Dept: HEPATOLOGY | Facility: CLINIC | Age: 48
End: 2023-09-01
Payer: MEDICAID

## 2023-09-01 ENCOUNTER — TELEPHONE (OUTPATIENT)
Dept: HEPATOLOGY | Facility: CLINIC | Age: 48
End: 2023-09-01

## 2023-09-01 ENCOUNTER — PROCEDURE VISIT (OUTPATIENT)
Dept: HEPATOLOGY | Facility: CLINIC | Age: 48
End: 2023-09-01
Payer: MEDICAID

## 2023-09-01 VITALS — BODY MASS INDEX: 33.5 KG/M2 | WEIGHT: 170.63 LBS | HEIGHT: 60 IN

## 2023-09-01 DIAGNOSIS — K76.0 NAFLD (NONALCOHOLIC FATTY LIVER DISEASE): ICD-10-CM

## 2023-09-01 DIAGNOSIS — I10 HYPERTENSION, UNSPECIFIED TYPE: ICD-10-CM

## 2023-09-01 DIAGNOSIS — K76.0 NAFLD (NONALCOHOLIC FATTY LIVER DISEASE): Primary | ICD-10-CM

## 2023-09-01 DIAGNOSIS — E78.5 HYPERLIPIDEMIA, UNSPECIFIED HYPERLIPIDEMIA TYPE: ICD-10-CM

## 2023-09-01 DIAGNOSIS — R74.8 ELEVATED LIVER ENZYMES: ICD-10-CM

## 2023-09-01 PROCEDURE — 99213 OFFICE O/P EST LOW 20 MIN: CPT | Mod: PBBFAC | Performed by: NURSE PRACTITIONER

## 2023-09-01 PROCEDURE — 3044F HG A1C LEVEL LT 7.0%: CPT | Mod: CPTII,,, | Performed by: NURSE PRACTITIONER

## 2023-09-01 PROCEDURE — 3008F BODY MASS INDEX DOCD: CPT | Mod: CPTII,,, | Performed by: NURSE PRACTITIONER

## 2023-09-01 PROCEDURE — 1159F MED LIST DOCD IN RCRD: CPT | Mod: CPTII,,, | Performed by: NURSE PRACTITIONER

## 2023-09-01 PROCEDURE — 76981 FIBROSCAN NEW ORLEANS (VIBRATION CONTROLLED TRANSIENT ELASTOGRAPHY): ICD-10-PCS | Mod: 26,S$PBB,, | Performed by: NURSE PRACTITIONER

## 2023-09-01 PROCEDURE — 1159F PR MEDICATION LIST DOCUMENTED IN MEDICAL RECORD: ICD-10-PCS | Mod: CPTII,,, | Performed by: NURSE PRACTITIONER

## 2023-09-01 PROCEDURE — 99214 OFFICE O/P EST MOD 30 MIN: CPT | Mod: S$PBB,,, | Performed by: NURSE PRACTITIONER

## 2023-09-01 PROCEDURE — 99999 PR PBB SHADOW E&M-EST. PATIENT-LVL III: ICD-10-PCS | Mod: PBBFAC,,, | Performed by: NURSE PRACTITIONER

## 2023-09-01 PROCEDURE — 99214 PR OFFICE/OUTPT VISIT, EST, LEVL IV, 30-39 MIN: ICD-10-PCS | Mod: S$PBB,,, | Performed by: NURSE PRACTITIONER

## 2023-09-01 PROCEDURE — 91200 LIVER ELASTOGRAPHY: CPT | Mod: PBBFAC | Performed by: NURSE PRACTITIONER

## 2023-09-01 PROCEDURE — 3044F PR MOST RECENT HEMOGLOBIN A1C LEVEL <7.0%: ICD-10-PCS | Mod: CPTII,,, | Performed by: NURSE PRACTITIONER

## 2023-09-01 PROCEDURE — 76981 USE PARENCHYMA: CPT | Mod: 26,S$PBB,, | Performed by: NURSE PRACTITIONER

## 2023-09-01 PROCEDURE — 99999 PR PBB SHADOW E&M-EST. PATIENT-LVL III: CPT | Mod: PBBFAC,,, | Performed by: NURSE PRACTITIONER

## 2023-09-01 PROCEDURE — 3008F PR BODY MASS INDEX (BMI) DOCUMENTED: ICD-10-PCS | Mod: CPTII,,, | Performed by: NURSE PRACTITIONER

## 2023-09-01 RX ORDER — PROMETHAZINE HYDROCHLORIDE 25 MG/1
25 TABLET ORAL EVERY 6 HOURS PRN
COMMUNITY
Start: 2023-07-01 | End: 2023-10-20 | Stop reason: SDUPTHER

## 2023-09-01 RX ORDER — ONDANSETRON 4 MG/1
8 TABLET, FILM COATED ORAL EVERY 6 HOURS PRN
COMMUNITY
Start: 2023-07-13 | End: 2023-10-20 | Stop reason: ALTCHOICE

## 2023-09-01 NOTE — PROCEDURES
FibroScan [place] (Vibration Controlled Transient Elastography)    Date/Time: 9/1/2023 10:45 AM    Performed by: Katt Cisse NP  Authorized by: Katt Cisse NP    Diagnosis:  NAFLD    Probe:  M    Universal Protocol: Patient's identity, procedure and site were verified, confirmatory pause was performed.  Discussed procedure including risks and potential complications.  Questions answered.  Patient verbalizes understanding and wishes to proceed with VCTE.     Procedure: After providing explanations of the procedure, patient was placed in the supine position with right arm in maximum abduction to allow optimal exposure of right lateral abdomen.  Patient was briefly assessed, Testing was performed in the mid-axillary location, 50Hz Shear Wave pulses were applied and the resulting Shear Wave and Propagation Speed detected with a 3.5 MHz ultrasonic signal, using the FibroScan probe, Skin to liver capsule distance and liver parenchyma were accessed during the entire examination with the FibroScan probe, Patient was instructed to breathe normally and to abstain from sudden movements during the procedure, allowing for random measurements of liver stiffness. At least 10 Shear Waves were produced, Individual measurements of each Shear Wave were calculated.  Patient tolerated the procedure well with no complications.  Meets discharge criteria as was dismissed.  Rates pain 0 out of 10.  Patient will follow up with ordering provider to review results.    Findings  Median liver stiffness score:  3.8  CAP Reading: dB/m:  282    IQR/med %:  11  Interpretation  Fibrosis interpretation is based on medial liver stiffness - Kilopascal (kPa).    Fibrosis Stage:  F 0-1  Steatosis interpretation is based on controlled attenuation parameter - (dB/m).    Steatosis Grade:  S2

## 2023-09-01 NOTE — TELEPHONE ENCOUNTER
Recall placed for 1 year follow up in [facility]  ----- Message from Katt Cisse NP sent at 9/1/2023 11:40 AM CDT -----  Please enter recall for f/u visit in 1 year- [facility]. Thanks.

## 2023-09-01 NOTE — PROGRESS NOTES
[facility] - Established Patient    Last Clinic Visit: 6/1/23    Chief Complaint: Follow-up for fatty liver       HISTORY     This is a 48 y.o. female with PMH noted below, here for follow-up of fatty liver.    Noted history- SLE and mixed connective tissue disorder. Not currently followed by Rheumatology.       Was first told about having fatty liver in her 20s.     Fatty liver first noted on CT in 2020. Imaging has shown hepatomegaly though no findings to suggest advanced fibrosis.     Risk factors for fatty liver include:   BMI up to 33, HLD  No DM or alcohol use.    Her transaminases have been intermittently elevated since 2020, most recently AST only (42).     Serologic workup has been negative for hemochromatosis, autoimmune etiology, and viral hepatitis. Weakly +ASMA 1:40 noted.    Fibrosis staging:   Fibroscan 8/2023: F0-F1 (kPa 3.8), S2 ()     Interval history:  Continues to have multiple health concerns; trying to see one specialist at a time. Breathing and energy level has improved since seeing Pulmonary for COPD. Has a consult with cardiology soon for vascular calcifications/stenosis.     No symptoms of hepatic decompensation including jaundice, ascites, cognitive problems to suggest hepatic encephalopathy, or GI bleeding.     Updates on risk factors for fatty liver:  Weight -- Body mass index is 33.33 kg/m².     Was able to lose 18 lb though has gained this back. Trying to watch her diet.                      Dyslipidemia -- elevated  On statin                                Insulin resistance / diabetes -- no         Alcohol use -- none    Health Maintenance:  - Most recent imaging: CT 5/2023 without focal hepatic lesion  - Hepatitis A & B vaccination: +hep B immunity, needs hep A vaccines          Past medical history, surgical history, problem list, family history, social history, allergies: Reviewed and updated in the appropriate section of the electronic medical  record.      Current Outpatient Medications   Medication Sig Dispense Refill    albuterol (VENTOLIN HFA) 90 mcg/actuation inhaler Inhale 2 puffs into the lungs every 6 (six) hours as needed for Wheezing. Rescue 18 g 0    atorvastatin (LIPITOR) 20 MG tablet Take 1 tablet (20 mg total) by mouth once daily. 90 tablet 3    butalbital-acetaminophen-caffeine -40 mg (FIORICET, ESGIC) -40 mg per tablet Take 1 tablet by mouth every 6 (six) hours as needed for Headaches. 40 tablet 3    cyanocobalamin 1,000 mcg/mL injection Inject 1 mL (1,000 mcg total) into the muscle every 30 days. 10 mL 0    diazePAM (VALIUM) 10 MG Tab Take 1 tablet (10 mg total) by mouth 2 (two) times daily as needed (anxiety). 60 tablet 2    ergocalciferol (ERGOCALCIFEROL) 50,000 unit Cap Take 1 capsule (50,000 Units total) by mouth every 7 days. 12 capsule 3    fluticasone-umeclidin-vilanter (TRELEGY ELLIPTA) 200-62.5-25 mcg inhaler Inhale 1 puff into the lungs once daily. 60 each 6    HYDROcodone-acetaminophen (NORCO)  mg per tablet Take 1 tablet by mouth every 6 (six) hours as needed for Pain. 20 tablet 0    hydrOXYzine (ATARAX) 50 MG tablet Take 1 tablet (50 mg total) by mouth 3 (three) times daily. 270 tablet 3    ibuprofen (ADVIL,MOTRIN) 800 MG tablet Take 800 mg by mouth every 8 (eight) hours as needed for Pain.      methocarbamoL (ROBAXIN) 500 MG Tab Take 1 tablet (500 mg total) by mouth 3 (three) times daily. 270 tablet 3    ondansetron (ZOFRAN) 4 MG tablet Take 8 mg by mouth every 6 (six) hours as needed.      promethazine (PHENERGAN) 25 MG tablet Take 25 mg by mouth every 6 (six) hours as needed.      propranoloL (INDERAL) 20 MG tablet Take 1 tablet (20 mg total) by mouth 2 (two) times daily. 180 tablet 3     No current facility-administered medications for this visit.     Medication list reviewed and updated.      Review of Systems - as per HPI  Constitutional: Negative for fatigue or unexpected weight change.   Respiratory:  "Negative for shortness of breath.    Cardiovascular: Negative for leg swelling.  Gastrointestinal: Negative for abdominal distention or abdominal pain. Negative for melena or hematemesis.  Musculoskeletal: Negative for myalgias.    Skin: Negative for jaundice or itching.  Neurological: Negative for confusion or slowed mentation. Negative for tremors.   Hematological: Does not bruise/bleed easily.   Psychiatric: Negative for sleep disturbance.      Physical Exam   Constitutional: Well-nourished. No distress. Alert and oriented.  Eyes: No scleral icterus.   Pulmonary/Chest: Respiratory effort normal. No respiratory distress.   Abdominal: No distension, no ascites appreciated.   Extremities: No edema.   Neurological: No tremor.   Skin: No jaundice. No spider telangiectasias.  Psychiatric: Normal mood and affect. Speech, behavior, and thought content normal.         LABS & DIAGNOSTIC STUDIES     I have personally reviewed pertinent laboratory findings:    Lab Results   Component Value Date    ALT 26 05/16/2023    AST 42 (H) 05/16/2023    ALKPHOS 83 05/16/2023    BILITOT 1.1 05/16/2023    ALBUMIN 4.8 05/16/2023    INR 1.1 11/13/2021       Lab Results   Component Value Date    WBC 6.34 05/16/2023    HGB 14.7 05/16/2023    HCT 42.4 05/16/2023    MCV 92 05/16/2023     05/16/2023       Lab Results   Component Value Date     05/16/2023    K 5.4 (H) 05/16/2023    BUN 13 05/16/2023    CREATININE 0.70 05/16/2023    ESTGFRAFRICA >60 07/31/2022    EGFRNONAA >60 07/31/2022       Lab Results   Component Value Date    SMOOTHMUSCAB Positive 1:40 (A) 06/01/2023    IGGSERUM 941 06/01/2023    ANASCREEN None Detected 06/12/2023    FERRITIN 182 06/01/2023    FESATURATED 19 (L) 06/01/2023    HEPBSAG Non-reactive 06/01/2023    HEPBCAB Non-reactive 06/01/2023    HEPCAB Non-reactive 04/25/2023    [id number] Non-reactive 04/25/2023       No results found for: "AFP"    I have personally reviewed the following result reports:  CT - " 5/4/23      ASSESSMENT & PLAN     48 y.o. female with:    1. NAFLD   -- Liver enzymes normal-mildly elevated this year. Discussed that weakly +ASMA can often be seen in patients with fatty liver; no suspicion for AIH at this time  -- Recommend labs to monitor liver enzymes/LFTs 1-2 times per year with PCP  -- Fibroscan is reassuring, no-minimal fibrosis (F0-F1). Repeat in 2 years   -- US surveillance every 2 years    Reminded that the only treatment for fatty liver is weight loss, maintaining good control of metabolic risk factors (blood pressure, cholesterol, and blood sugar), and moderating alcohol use.      2. Body mass index is 33.33 kg/m²., HTN, HLD  -- Reviewed weight loss strategies including dietary changes. Recommend low carbohydrate/sugar, high protein/fiber diet. Recommend long-term weight loss goal of 10% (about 17 lb).   -- Has consult w/ cardiology      *See AVS for patient education and instructions.      Return to clinic in 1 year- Anuj       Thank you for allowing me to participate in the care of Wanda Hirsch    Katt Cisse, REALP-C  Hepatology        Duration of encounter: 30 min  This includes face to face time and non-face to face time preparing to see the patient (eg, review of tests), obtaining and/or reviewing separately obtained history, documenting clinical information in the electronic or other health record, independently interpreting results and communicating results to the patient/family/caregiver, or care coordination.

## 2023-09-01 NOTE — PATIENT INSTRUCTIONS
Continue routine labs with PCP  Follow-up in 1 year in [place]  Weight loss efforts and good control of blood pressure / cholesterol

## 2023-09-05 DIAGNOSIS — J44.9 CHRONIC OBSTRUCTIVE PULMONARY DISEASE, UNSPECIFIED COPD TYPE: ICD-10-CM

## 2023-09-06 RX ORDER — ALBUTEROL SULFATE 90 UG/1
2 AEROSOL, METERED RESPIRATORY (INHALATION) EVERY 6 HOURS PRN
Qty: 18 G | Refills: 0 | Status: SHIPPED | OUTPATIENT
Start: 2023-09-06 | End: 2024-01-19 | Stop reason: SDUPTHER

## 2023-09-19 ENCOUNTER — OFFICE VISIT (OUTPATIENT)
Dept: CARDIOLOGY | Facility: CLINIC | Age: 48
End: 2023-09-19
Payer: MEDICAID

## 2023-09-19 ENCOUNTER — OFFICE VISIT (OUTPATIENT)
Dept: PRIMARY CARE CLINIC | Facility: CLINIC | Age: 48
End: 2023-09-19
Payer: MEDICAID

## 2023-09-19 VITALS
WEIGHT: 174.19 LBS | HEIGHT: 60 IN | BODY MASS INDEX: 34.2 KG/M2 | DIASTOLIC BLOOD PRESSURE: 88 MMHG | SYSTOLIC BLOOD PRESSURE: 130 MMHG | HEART RATE: 103 BPM

## 2023-09-19 VITALS
HEIGHT: 60 IN | BODY MASS INDEX: 33.93 KG/M2 | HEART RATE: 86 BPM | WEIGHT: 172.81 LBS | OXYGEN SATURATION: 99 % | SYSTOLIC BLOOD PRESSURE: 132 MMHG | DIASTOLIC BLOOD PRESSURE: 68 MMHG

## 2023-09-19 DIAGNOSIS — I77.1 SUBCLAVIAN ARTERIAL STENOSIS: ICD-10-CM

## 2023-09-19 DIAGNOSIS — M54.12 CERVICAL RADICULAR PAIN: ICD-10-CM

## 2023-09-19 DIAGNOSIS — I10 PRIMARY HYPERTENSION: ICD-10-CM

## 2023-09-19 DIAGNOSIS — E23.6 EMPTY SELLA SYNDROME: ICD-10-CM

## 2023-09-19 DIAGNOSIS — I77.1 STENOSIS OF ILIAC ARTERY, UNSPECIFIED LATERALITY: ICD-10-CM

## 2023-09-19 DIAGNOSIS — R60.1 GENERALIZED EDEMA: ICD-10-CM

## 2023-09-19 DIAGNOSIS — M54.9 DORSALGIA, UNSPECIFIED: Primary | ICD-10-CM

## 2023-09-19 DIAGNOSIS — Z72.0 TOBACCO ABUSE: ICD-10-CM

## 2023-09-19 DIAGNOSIS — M54.31 SCIATICA OF RIGHT SIDE: ICD-10-CM

## 2023-09-19 DIAGNOSIS — R06.02 SOB (SHORTNESS OF BREATH): ICD-10-CM

## 2023-09-19 DIAGNOSIS — J44.1 COPD WITH ACUTE EXACERBATION: Primary | ICD-10-CM

## 2023-09-19 DIAGNOSIS — R10.9 ABDOMINAL PAIN, UNSPECIFIED ABDOMINAL LOCATION: ICD-10-CM

## 2023-09-19 DIAGNOSIS — Z00.00 WELLNESS EXAMINATION: ICD-10-CM

## 2023-09-19 LAB
ALBUMIN SERPL BCP-MCNC: 3.8 G/DL (ref 3.5–5.2)
ALP SERPL-CCNC: 116 U/L (ref 55–135)
ALT SERPL W/O P-5'-P-CCNC: 25 U/L (ref 10–44)
ANION GAP SERPL CALC-SCNC: 13 MMOL/L (ref 8–16)
AST SERPL-CCNC: 20 U/L (ref 10–40)
BASOPHILS # BLD AUTO: 0.07 K/UL (ref 0–0.2)
BASOPHILS NFR BLD: 1 % (ref 0–1.9)
BILIRUB SERPL-MCNC: 0.3 MG/DL (ref 0.1–1)
BUN SERPL-MCNC: 9 MG/DL (ref 6–20)
CALCIUM SERPL-MCNC: 9.2 MG/DL (ref 8.7–10.5)
CHLORIDE SERPL-SCNC: 108 MMOL/L (ref 95–110)
CHOLEST SERPL-MCNC: 261 MG/DL (ref 120–199)
CHOLEST/HDLC SERPL: 6.7 {RATIO} (ref 2–5)
CO2 SERPL-SCNC: 19 MMOL/L (ref 23–29)
CREAT SERPL-MCNC: 0.7 MG/DL (ref 0.5–1.4)
DIFFERENTIAL METHOD: ABNORMAL
EOSINOPHIL # BLD AUTO: 0.3 K/UL (ref 0–0.5)
EOSINOPHIL NFR BLD: 4 % (ref 0–8)
ERYTHROCYTE [DISTWIDTH] IN BLOOD BY AUTOMATED COUNT: 12.4 % (ref 11.5–14.5)
EST. GFR  (NO RACE VARIABLE): >60 ML/MIN/1.73 M^2
GLUCOSE SERPL-MCNC: 99 MG/DL (ref 70–110)
HCT VFR BLD AUTO: 41.9 % (ref 37–48.5)
HDLC SERPL-MCNC: 39 MG/DL (ref 40–75)
HDLC SERPL: 14.9 % (ref 20–50)
HGB BLD-MCNC: 14.6 G/DL (ref 12–16)
IMM GRANULOCYTES # BLD AUTO: 0.01 K/UL (ref 0–0.04)
IMM GRANULOCYTES NFR BLD AUTO: 0.1 % (ref 0–0.5)
LDLC SERPL CALC-MCNC: 197.4 MG/DL (ref 63–159)
LYMPHOCYTES # BLD AUTO: 3.1 K/UL (ref 1–4.8)
LYMPHOCYTES NFR BLD: 44.8 % (ref 18–48)
MCH RBC QN AUTO: 31.6 PG (ref 27–31)
MCHC RBC AUTO-ENTMCNC: 34.8 G/DL (ref 32–36)
MCV RBC AUTO: 91 FL (ref 82–98)
MONOCYTES # BLD AUTO: 0.4 K/UL (ref 0.3–1)
MONOCYTES NFR BLD: 6.2 % (ref 4–15)
NEUTROPHILS # BLD AUTO: 3.1 K/UL (ref 1.8–7.7)
NEUTROPHILS NFR BLD: 43.9 % (ref 38–73)
NONHDLC SERPL-MCNC: 222 MG/DL
NRBC BLD-RTO: 0 /100 WBC
PLATELET # BLD AUTO: 236 K/UL (ref 150–450)
PMV BLD AUTO: 11.8 FL (ref 9.2–12.9)
POTASSIUM SERPL-SCNC: 4.4 MMOL/L (ref 3.5–5.1)
PROT SERPL-MCNC: 6.8 G/DL (ref 6–8.4)
RBC # BLD AUTO: 4.62 M/UL (ref 4–5.4)
SODIUM SERPL-SCNC: 140 MMOL/L (ref 136–145)
TRIGL SERPL-MCNC: 123 MG/DL (ref 30–150)
TSH SERPL DL<=0.005 MIU/L-ACNC: 1.04 UIU/ML (ref 0.4–4)
TSH SERPL DL<=0.005 MIU/L-ACNC: 1.04 UIU/ML (ref 0.4–4)
WBC # BLD AUTO: 6.96 K/UL (ref 3.9–12.7)

## 2023-09-19 PROCEDURE — 99214 PR OFFICE/OUTPT VISIT, EST, LEVL IV, 30-39 MIN: ICD-10-PCS | Mod: S$GLB,,, | Performed by: PHYSICIAN ASSISTANT

## 2023-09-19 PROCEDURE — 1159F MED LIST DOCD IN RCRD: CPT | Mod: CPTII,,, | Performed by: INTERNAL MEDICINE

## 2023-09-19 PROCEDURE — 84443 ASSAY THYROID STIM HORMONE: CPT | Performed by: PHYSICIAN ASSISTANT

## 2023-09-19 PROCEDURE — 3078F DIAST BP <80 MM HG: CPT | Mod: CPTII,S$GLB,, | Performed by: PHYSICIAN ASSISTANT

## 2023-09-19 PROCEDURE — 1160F PR REVIEW ALL MEDS BY PRESCRIBER/CLIN PHARMACIST DOCUMENTED: ICD-10-PCS | Mod: CPTII,,, | Performed by: INTERNAL MEDICINE

## 2023-09-19 PROCEDURE — 3079F PR MOST RECENT DIASTOLIC BLOOD PRESSURE 80-89 MM HG: ICD-10-PCS | Mod: CPTII,,, | Performed by: INTERNAL MEDICINE

## 2023-09-19 PROCEDURE — 1160F PR REVIEW ALL MEDS BY PRESCRIBER/CLIN PHARMACIST DOCUMENTED: ICD-10-PCS | Mod: CPTII,S$GLB,, | Performed by: PHYSICIAN ASSISTANT

## 2023-09-19 PROCEDURE — 3078F PR MOST RECENT DIASTOLIC BLOOD PRESSURE < 80 MM HG: ICD-10-PCS | Mod: CPTII,S$GLB,, | Performed by: PHYSICIAN ASSISTANT

## 2023-09-19 PROCEDURE — 99214 OFFICE O/P EST MOD 30 MIN: CPT | Mod: PBBFAC,PO | Performed by: INTERNAL MEDICINE

## 2023-09-19 PROCEDURE — 3075F PR MOST RECENT SYSTOLIC BLOOD PRESS GE 130-139MM HG: ICD-10-PCS | Mod: CPTII,,, | Performed by: INTERNAL MEDICINE

## 2023-09-19 PROCEDURE — 3008F PR BODY MASS INDEX (BMI) DOCUMENTED: ICD-10-PCS | Mod: CPTII,,, | Performed by: INTERNAL MEDICINE

## 2023-09-19 PROCEDURE — 3044F PR MOST RECENT HEMOGLOBIN A1C LEVEL <7.0%: ICD-10-PCS | Mod: CPTII,,, | Performed by: INTERNAL MEDICINE

## 2023-09-19 PROCEDURE — 80053 COMPREHEN METABOLIC PANEL: CPT | Performed by: PHYSICIAN ASSISTANT

## 2023-09-19 PROCEDURE — 99204 OFFICE O/P NEW MOD 45 MIN: CPT | Mod: S$PBB,,, | Performed by: INTERNAL MEDICINE

## 2023-09-19 PROCEDURE — 80061 LIPID PANEL: CPT | Performed by: PHYSICIAN ASSISTANT

## 2023-09-19 PROCEDURE — 3044F HG A1C LEVEL LT 7.0%: CPT | Mod: CPTII,S$GLB,, | Performed by: PHYSICIAN ASSISTANT

## 2023-09-19 PROCEDURE — 1160F RVW MEDS BY RX/DR IN RCRD: CPT | Mod: CPTII,,, | Performed by: INTERNAL MEDICINE

## 2023-09-19 PROCEDURE — 3075F SYST BP GE 130 - 139MM HG: CPT | Mod: CPTII,,, | Performed by: INTERNAL MEDICINE

## 2023-09-19 PROCEDURE — 99204 PR OFFICE/OUTPT VISIT, NEW, LEVL IV, 45-59 MIN: ICD-10-PCS | Mod: S$PBB,,, | Performed by: INTERNAL MEDICINE

## 2023-09-19 PROCEDURE — 3075F PR MOST RECENT SYSTOLIC BLOOD PRESS GE 130-139MM HG: ICD-10-PCS | Mod: CPTII,S$GLB,, | Performed by: PHYSICIAN ASSISTANT

## 2023-09-19 PROCEDURE — 99999 PR PBB SHADOW E&M-EST. PATIENT-LVL IV: ICD-10-PCS | Mod: PBBFAC,,, | Performed by: INTERNAL MEDICINE

## 2023-09-19 PROCEDURE — 3044F PR MOST RECENT HEMOGLOBIN A1C LEVEL <7.0%: ICD-10-PCS | Mod: CPTII,S$GLB,, | Performed by: PHYSICIAN ASSISTANT

## 2023-09-19 PROCEDURE — 1159F PR MEDICATION LIST DOCUMENTED IN MEDICAL RECORD: ICD-10-PCS | Mod: CPTII,,, | Performed by: INTERNAL MEDICINE

## 2023-09-19 PROCEDURE — 3044F HG A1C LEVEL LT 7.0%: CPT | Mod: CPTII,,, | Performed by: INTERNAL MEDICINE

## 2023-09-19 PROCEDURE — 3008F BODY MASS INDEX DOCD: CPT | Mod: CPTII,S$GLB,, | Performed by: PHYSICIAN ASSISTANT

## 2023-09-19 PROCEDURE — 1160F RVW MEDS BY RX/DR IN RCRD: CPT | Mod: CPTII,S$GLB,, | Performed by: PHYSICIAN ASSISTANT

## 2023-09-19 PROCEDURE — 3008F BODY MASS INDEX DOCD: CPT | Mod: CPTII,,, | Performed by: INTERNAL MEDICINE

## 2023-09-19 PROCEDURE — 1159F MED LIST DOCD IN RCRD: CPT | Mod: CPTII,S$GLB,, | Performed by: PHYSICIAN ASSISTANT

## 2023-09-19 PROCEDURE — 1159F PR MEDICATION LIST DOCUMENTED IN MEDICAL RECORD: ICD-10-PCS | Mod: CPTII,S$GLB,, | Performed by: PHYSICIAN ASSISTANT

## 2023-09-19 PROCEDURE — 3075F SYST BP GE 130 - 139MM HG: CPT | Mod: CPTII,S$GLB,, | Performed by: PHYSICIAN ASSISTANT

## 2023-09-19 PROCEDURE — 99999 PR PBB SHADOW E&M-EST. PATIENT-LVL IV: CPT | Mod: PBBFAC,,, | Performed by: INTERNAL MEDICINE

## 2023-09-19 PROCEDURE — 85025 COMPLETE CBC W/AUTO DIFF WBC: CPT | Performed by: PHYSICIAN ASSISTANT

## 2023-09-19 PROCEDURE — 3008F PR BODY MASS INDEX (BMI) DOCUMENTED: ICD-10-PCS | Mod: CPTII,S$GLB,, | Performed by: PHYSICIAN ASSISTANT

## 2023-09-19 PROCEDURE — 3079F DIAST BP 80-89 MM HG: CPT | Mod: CPTII,,, | Performed by: INTERNAL MEDICINE

## 2023-09-19 PROCEDURE — 99214 OFFICE O/P EST MOD 30 MIN: CPT | Mod: S$GLB,,, | Performed by: PHYSICIAN ASSISTANT

## 2023-09-19 RX ORDER — HYDROCODONE BITARTRATE AND ACETAMINOPHEN 10; 325 MG/1; MG/1
1 TABLET ORAL EVERY 6 HOURS PRN
Qty: 20 TABLET | Refills: 0 | Status: SHIPPED | OUTPATIENT
Start: 2023-09-19 | End: 2023-10-20 | Stop reason: SDUPTHER

## 2023-09-19 NOTE — PROGRESS NOTES
Subjective     Patient ID: Wanda Hirsch is a 48 y.o. female.    Chief Complaint: Fall (Er fu )    Fall  The accident occurred 5 to 7 days ago. Fall occurred: while going up stairs. She fell from a height of 3 to 5 ft. She landed on [place]. Point of impact: back. The pain is present in the neck and back. The pain is severe. The symptoms are aggravated by flexion, movement, pressure on injury, rotation, sitting, standing, use of injured limb, extension and ambulation. Pertinent negatives include no abdominal pain, fever or numbness. Associated symptoms comments: Has radicular symptoms down the right arm and right leg. She has tried rest, NSAID, ice and heat for the symptoms. The treatment provided no relief.     Review of Systems   Constitutional:  Positive for activity change. Negative for fever.   HENT: Negative.     Respiratory:  Negative for chest tightness, shortness of breath and wheezing.    Gastrointestinal:  Negative for abdominal pain.   Integumentary:  Negative.   Neurological:  Negative for dizziness, vertigo and numbness.   Hematological:  Negative for adenopathy.     Past Medical History:   Diagnosis Date    Anxiety     Arthritis     COPD (chronic obstructive pulmonary disease)     COVID-19     sept 2021    IBS (irritable bowel syndrome)     Interstitial cystitis (chronic) with hematuria     per patient    Lupus     per patient    Migraine headache     Mixed connective tissue disease     per patient    Nephrolithiasis             Objective     Physical Exam  Vitals reviewed.   Constitutional:       General: She is not in acute distress.     Appearance: Normal appearance. She is not ill-appearing, toxic-appearing or diaphoretic.   Cardiovascular:      Rate and Rhythm: Normal rate and regular rhythm.   Pulmonary:      Effort: Pulmonary effort is normal.      Breath sounds: Normal breath sounds.   Abdominal:      Palpations: Abdomen is soft.      Tenderness: There is no abdominal tenderness.    Musculoskeletal:      Right shoulder: Tenderness present. Decreased range of motion.        Arms:       Lumbar back: Spasms and tenderness present. Decreased range of motion.        Legs:    Neurological:      Mental Status: She is alert.            Assessment and Plan     1. Dorsalgia, unspecified  -     MRI Lumbar Spine W WO Contrast; Future; Expected date: 09/19/2023  -     HYDROcodone-acetaminophen (NORCO)  mg per tablet; Take 1 tablet by mouth every 6 (six) hours as needed for Pain.  Dispense: 20 tablet; Refill: 0    2. Cervical radicular pain  -     MRI Cervical Spine Without Contrast; Future; Expected date: 09/19/2023  -     HYDROcodone-acetaminophen (NORCO)  mg per tablet; Take 1 tablet by mouth every 6 (six) hours as needed for Pain.  Dispense: 20 tablet; Refill: 0    3. Sciatica of right side  -     MRI Lumbar Spine W WO Contrast; Future; Expected date: 09/19/2023  -     HYDROcodone-acetaminophen (NORCO)  mg per tablet; Take 1 tablet by mouth every 6 (six) hours as needed for Pain.  Dispense: 20 tablet; Refill: 0    4. Wellness examination  -     Comprehensive Metabolic Panel; Future; Expected date: 09/19/2023  -     Lipid Panel; Future; Expected date: 09/19/2023  -     TSH; Future; Expected date: 09/19/2023  -     CBC Auto Differential; Future; Expected date: 09/19/2023    5. Empty sella syndrome  -     TSH        I spent 30 minutes on this encounter, time includes face-to-face, chart review, documentation, test review and orders.

## 2023-09-19 NOTE — PROGRESS NOTES
Subjective:    Patient ID:  Wanda Hirsch is a 48 y.o. female patient here for evaluation Establish Care      History of Present Illness:  New patient cardiac evaluation.  No past documented structural ischemic or arrhythmic heart disease.  Recent CTA of the abdomen and pelvis reveal subclavian artery common iliac artery calcification.  Last noninvasive cardiac assessment number of years ago.  Patient symptomatic with chest pain shortness of breath.  History of chronic syncopal episodes.  Suspect underlying vasovagal.  Risk factors positive for ongoing tobacco use, family history, hypertension dyslipidemia.  Patient denies diabetes mellitus.    No CVA Ca. no DVT PE.  Underlying COPD.   Patient being followed for chronic pain syndrome, autoimmune disease, vascular headaches.  Workup in progress for possible empty sella syndrome        Review of patient's allergies indicates:   Allergen Reactions    Ketorolac Shortness Of Breath, Anaphylaxis, Hives, Itching and Swelling     Other reaction(s): Other (See Comments), Other (See Comments)  Throat swelling  states throat closes  states throat closes      Levofloxacin Anaphylaxis, Hives, Shortness Of Breath and Swelling     Throat swelling      Metoclopramide Hives, Itching and Swelling     Other reaction(s): Other (See Comments), Other (See Comments)  Throat swelling  States throat closes  States throat closes      Reglan [metoclopramide hcl] Anaphylaxis    Sulfa (sulfonamide antibiotics) Anaphylaxis     Other reaction(s): Other (See Comments), Other (See Comments)  Childhood allergy (unknown reaction)  Doesn't know      Bee venom protein (honey bee)        Past Medical History:   Diagnosis Date    Anxiety     Arthritis     COPD (chronic obstructive pulmonary disease)     COVID-19     sept 2021    IBS (irritable bowel syndrome)     Interstitial cystitis (chronic) with hematuria     per patient    Lupus     per patient    Migraine headache     Mixed connective tissue  disease     per patient    Nephrolithiasis      Past Surgical History:   Procedure Laterality Date    APPENDECTOMY       SECTION      CHOLECYSTECTOMY      Cyst removed from urethra      HYSTERECTOMY  2002    OOPHORECTOMY  2002     Social History     Tobacco Use    Smoking status: Every Day     Current packs/day: 1.00     Average packs/day: 1 pack/day for 36.7 years (36.7 ttl pk-yrs)     Types: Cigarettes     Start date:     Smokeless tobacco: Never   Substance Use Topics    Alcohol use: Yes    Drug use: Yes     Types: Marijuana        Review of Systems:    As noted in HPI in addition         REVIEW OF SYSTEMS  Review of Systems   Constitutional: Positive for malaise/fatigue. Negative for decreased appetite, diaphoresis, night sweats, weight gain and weight loss.   HENT:  Negative for nosebleeds and odynophagia.    Eyes:  Negative for double vision and photophobia.   Cardiovascular:  Positive for chest pain and dyspnea on exertion. Negative for claudication, cyanosis, irregular heartbeat, leg swelling, near-syncope, orthopnea, palpitations, paroxysmal nocturnal dyspnea and syncope.   Respiratory:  Positive for shortness of breath. Negative for cough, hemoptysis and wheezing.    Hematologic/Lymphatic: Negative for adenopathy.   Skin:  Negative for flushing, skin cancer and suspicious lesions.   Musculoskeletal:  Positive for back pain and neck pain. Negative for gout and myalgias.   Gastrointestinal:  Negative for abdominal pain, heartburn, hematemesis and hematochezia.   Genitourinary:  Negative for bladder incontinence, hesitancy and nocturia.   Neurological:  Negative for disturbances in coordination, focal weakness, headaches, light-headedness and paresthesias.   Psychiatric/Behavioral:  Negative for memory loss and substance abuse.        Objective:        Vitals:    23 1424   BP: 130/88   Pulse: 103       Lab Results   Component Value Date    WBC 6.34 2023    HGB 14.7 2023     HCT 42.4 05/16/2023     05/16/2023    CHOL 277 (H) 04/25/2023    TRIG 107 04/25/2023    HDL 42 04/25/2023    ALT 26 05/16/2023    AST 42 (H) 05/16/2023     05/16/2023    K 5.4 (H) 05/16/2023     05/16/2023    CREATININE 0.70 05/16/2023    BUN 13 05/16/2023    CO2 25 05/16/2023    TSH 1.867 04/25/2023    INR 1.1 11/13/2021    HGBA1C 5.4 06/01/2023      CARDIOGRAM RESULTS  No results found for this or any previous visit.        CURRENT/PREVIOUS VISIT EKG  Results for orders placed or performed during the hospital encounter of 05/16/23   EKG 12-lead    Collection Time: 05/16/23  3:58 PM    Narrative    Test Reason : R06.02,    Vent. Rate : 094 BPM     Atrial Rate : 094 BPM     P-R Int : 158 ms          QRS Dur : 080 ms      QT Int : 330 ms       P-R-T Axes : 071 089 067 degrees     QTc Int : 412 ms    Normal sinus rhythm  Possible Left atrial enlargement  Borderline Abnormal ECG  When compared with ECG of 25-AUG-2022 17:25,  No significant change was found  Confirmed by Hiren De Leon MD ([id number]) on 6/7/2023 7:12:36 PM    Referred By: YASMEEN   SELF           Confirmed By:Hiren De Leon MD     No valid procedures specified.   No results found for this or any previous visit.    No valid procedures specified.    PHYSICAL EXAM  CONSTITUTIONAL: Well built, well nourished in no apparent distress  NECK: no carotid bruit, no JVD  LUNGS:  Decreased breath sounds bilaterally scattered rhonchi.  CHEST WALL: no tenderness,  HEART: regular rate and rhythm, S1, S2 normal, no murmur, click, rub or gallop   ABDOMEN: soft, non-tender; bowel sounds normal; no masses,  no organomegaly  EXTREMITIES: Extremities normal, no edema, no calf tenderness noted  VASCULAR EXAM: 2 PLUS UPPER AND LOWER EXT PULSES  NEURO: AAO X 3, NO ACUTE FOCAL OR LATERALIZING FINDINGS    I HAVE REVIEWED :    The vital signs, nurses notes, and all the pertinent radiology and labs.         Current Outpatient Medications   Medication Instructions     albuterol (PROVENTIL/VENTOLIN HFA) 90 mcg/actuation inhaler 2 puffs, Inhalation, Every 6 hours PRN, Rescue    atorvastatin (LIPITOR) 20 mg, Oral, Daily    butalbital-acetaminophen-caffeine -40 mg (FIORICET, ESGIC) -40 mg per tablet 1 tablet, Oral, Every 6 hours PRN    cyanocobalamin 1,000 mcg, Intramuscular, Every 30 days    diazePAM (VALIUM) 10 mg, Oral, 2 times daily PRN    ergocalciferol (ERGOCALCIFEROL) 50,000 Units, Oral, Every 7 days    fluticasone-umeclidin-vilanter (TRELEGY ELLIPTA) 200-62.5-25 mcg inhaler 1 puff, Inhalation, Daily    HYDROcodone-acetaminophen (NORCO)  mg per tablet 1 tablet, Oral, Every 6 hours PRN    hydrOXYzine (ATARAX) 50 mg, Oral, 3 times daily    ibuprofen (ADVIL,MOTRIN) 800 mg, Oral, Every 8 hours PRN    methocarbamoL (ROBAXIN) 500 mg, Oral, 3 times daily    ondansetron (ZOFRAN) 8 mg, Oral, Every 6 hours PRN    promethazine (PHENERGAN) 25 mg, Oral, Every 6 hours PRN    propranoloL (INDERAL) 20 mg, Oral, 2 times daily          Assessment:   Chest pain shortness of breath ongoing for about 1 year.  Last noninvasive cardiac assessment number years ago.  Abnormal chest abdominal CT with calcifications noted involve the subclavian artery and common femoral arteries bilaterally.  Risk factors include ongoing tobacco use, hypertension dyslipidemia family history  DJD cervical and lumbar spine.  Chronic pain syndrome.  Possible underlying empty sella syndrome        Plan:   Lexiscan, echo.  Holter monitor.  Carotid ultrasound  Follow up results.          No follow-ups on file.

## 2023-09-21 RX ORDER — ATORVASTATIN CALCIUM 40 MG/1
40 TABLET, FILM COATED ORAL DAILY
Qty: 90 TABLET | Refills: 3 | Status: SHIPPED | OUTPATIENT
Start: 2023-09-21 | End: 2024-01-22 | Stop reason: ALTCHOICE

## 2023-09-25 DIAGNOSIS — F41.9 ANXIETY: ICD-10-CM

## 2023-09-25 RX ORDER — DIAZEPAM 10 MG/1
10 TABLET ORAL 2 TIMES DAILY PRN
Qty: 60 TABLET | Refills: 2 | Status: SHIPPED | OUTPATIENT
Start: 2023-09-25 | End: 2023-10-20 | Stop reason: SDUPTHER

## 2023-10-11 ENCOUNTER — PATIENT MESSAGE (OUTPATIENT)
Dept: RADIOLOGY | Facility: HOSPITAL | Age: 48
End: 2023-10-11
Payer: MEDICAID

## 2023-10-13 ENCOUNTER — CLINICAL SUPPORT (OUTPATIENT)
Dept: CARDIOLOGY | Facility: HOSPITAL | Age: 48
End: 2023-10-13
Attending: INTERNAL MEDICINE
Payer: MEDICAID

## 2023-10-13 ENCOUNTER — HOSPITAL ENCOUNTER (OUTPATIENT)
Dept: RADIOLOGY | Facility: HOSPITAL | Age: 48
Discharge: HOME | End: 2023-10-13
Attending: INTERNAL MEDICINE
Payer: MEDICAID

## 2023-10-13 VITALS — BODY MASS INDEX: 34.16 KG/M2 | HEIGHT: 60 IN | WEIGHT: 174 LBS

## 2023-10-13 VITALS — HEIGHT: 60 IN | BODY MASS INDEX: 34.16 KG/M2 | WEIGHT: 174 LBS

## 2023-10-13 DIAGNOSIS — Z72.0 TOBACCO ABUSE: ICD-10-CM

## 2023-10-13 DIAGNOSIS — R60.1 GENERALIZED EDEMA: ICD-10-CM

## 2023-10-13 DIAGNOSIS — I77.1 SUBCLAVIAN ARTERIAL STENOSIS: ICD-10-CM

## 2023-10-13 DIAGNOSIS — R10.9 ABDOMINAL PAIN, UNSPECIFIED ABDOMINAL LOCATION: ICD-10-CM

## 2023-10-13 DIAGNOSIS — R06.02 SOB (SHORTNESS OF BREATH): ICD-10-CM

## 2023-10-13 DIAGNOSIS — I77.1 STENOSIS OF ILIAC ARTERY, UNSPECIFIED LATERALITY: ICD-10-CM

## 2023-10-13 DIAGNOSIS — J44.1 COPD WITH ACUTE EXACERBATION: ICD-10-CM

## 2023-10-13 DIAGNOSIS — I10 PRIMARY HYPERTENSION: ICD-10-CM

## 2023-10-13 LAB
AV INDEX (PROSTH): 0.9
AV MEAN GRADIENT: 3 MMHG
AV PEAK GRADIENT: 6 MMHG
AV VALVE AREA BY VELOCITY RATIO: 2.66 CM²
AV VALVE AREA: 2.68 CM²
AV VELOCITY RATIO: 0.89
BSA FOR ECHO PROCEDURE: 1.83 M2
CV ECHO LV RWT: 0.37 CM
CV PHARM DOSE: 0.4 MG
CV STRESS BASE HR: 66 BPM
DIASTOLIC BLOOD PRESSURE: 79 MMHG
DOP CALC AO PEAK VEL: 1.21 M/S
DOP CALC AO VTI: 22.4 CM
DOP CALC LVOT AREA: 3 CM2
DOP CALC LVOT DIAMETER: 1.95 CM
DOP CALC LVOT PEAK VEL: 1.08 M/S
DOP CALC LVOT STROKE VOLUME: 60 CM3
DOP CALCLVOT PEAK VEL VTI: 20.1 CM
E WAVE DECELERATION TIME: 156.75 MSEC
E/A RATIO: 1.19
E/E' RATIO: 8.38 M/S
ECHO LV POSTERIOR WALL: 0.82 CM (ref 0.6–1.1)
FRACTIONAL SHORTENING: 30 % (ref 28–44)
INTERVENTRICULAR SEPTUM: 0.82 CM (ref 0.6–1.1)
LEFT ATRIUM VOLUME INDEX MOD: 15.4 ML/M2
LEFT ATRIUM VOLUME MOD: 27.05 CM3
LEFT INTERNAL DIMENSION IN SYSTOLE: 3.13 CM (ref 2.1–4)
LEFT VENTRICLE DIASTOLIC VOLUME INDEX: 51.57 ML/M2
LEFT VENTRICLE DIASTOLIC VOLUME: 90.77 ML
LEFT VENTRICLE MASS INDEX: 66 G/M2
LEFT VENTRICLE SYSTOLIC VOLUME INDEX: 22.1 ML/M2
LEFT VENTRICLE SYSTOLIC VOLUME: 38.88 ML
LEFT VENTRICULAR INTERNAL DIMENSION IN DIASTOLE: 4.47 CM (ref 3.5–6)
LEFT VENTRICULAR MASS: 116.07 G
LV LATERAL E/E' RATIO: 7.33 M/S
LV SEPTAL E/E' RATIO: 9.78 M/S
LVOT MG: 2.56 MMHG
LVOT MV: 0.76 CM/S
MV PEAK A VEL: 0.74 M/S
MV PEAK E VEL: 0.88 M/S
MV STENOSIS PRESSURE HALF TIME: 45.46 MS
MV VALVE AREA P 1/2 METHOD: 4.84 CM2
NUC STRESS EJECTION FRACTION: 74 %
OHS CV CPX 1 MINUTE RECOVERY HEART RATE: 113 BPM
OHS CV CPX 85 PERCENT MAX PREDICTED HEART RATE MALE: 141
OHS CV CPX MAX PREDICTED HEART RATE: 166
OHS CV CPX PATIENT IS FEMALE: 1
OHS CV CPX PATIENT IS MALE: 0
OHS CV CPX PEAK DIASTOLIC BLOOD PRESSURE: 79 MMHG
OHS CV CPX PEAK HEAR RATE: 130 BPM
OHS CV CPX PEAK RATE PRESSURE PRODUCT: NORMAL
OHS CV CPX PEAK SYSTOLIC BLOOD PRESSURE: 152 MMHG
OHS CV CPX PERCENT MAX PREDICTED HEART RATE ACHIEVED: 79
OHS CV CPX RATE PRESSURE PRODUCT PRESENTING: 9636
OHS CV PHARM TIME: 1000 MIN
PISA TR MAX VEL: 2.2 M/S
RA PRESSURE ESTIMATED: 3 MMHG
RIGHT VENTRICULAR END-DIASTOLIC DIMENSION: 2.87 CM
RIGHT VENTRICULAR LENGTH IN DIASTOLE (APICAL 4-CHAMBER VIEW): 6.48 CM
RV MID DIAMA: 1.81 CM
RV TB RVSP: 5 MMHG
RV TISSUE DOPPLER FREE WALL SYSTOLIC VELOCITY 1 (APICAL 4 CHAMBER VIEW): 11.35 CM/S
SINUS: 2.53 CM
STJ: 2.29 CM
SYSTOLIC BLOOD PRESSURE: 146 MMHG
TDI LATERAL: 0.12 M/S
TDI SEPTAL: 0.09 M/S
TDI: 0.11 M/S
TR MAX PG: 19 MMHG
TRICUSPID ANNULAR PLANE SYSTOLIC EXCURSION: 1.99 CM
TV REST PULMONARY ARTERY PRESSURE: 22 MMHG
Z-SCORE OF LEFT VENTRICULAR DIMENSION IN END DIASTOLE: -0.85
Z-SCORE OF LEFT VENTRICULAR DIMENSION IN END SYSTOLE: 0.31

## 2023-10-13 PROCEDURE — 93016 NUCLEAR STRESS - CARDIOLOGY INTERPRETED (CUPID ONLY): ICD-10-PCS | Mod: ,,, | Performed by: INTERNAL MEDICINE

## 2023-10-13 PROCEDURE — 78452 HT MUSCLE IMAGE SPECT MULT: CPT | Mod: 26,,, | Performed by: INTERNAL MEDICINE

## 2023-10-13 PROCEDURE — 93016 CV STRESS TEST SUPVJ ONLY: CPT | Mod: ,,, | Performed by: INTERNAL MEDICINE

## 2023-10-13 PROCEDURE — 93880 EXTRACRANIAL BILAT STUDY: CPT | Mod: 26,,, | Performed by: INTERNAL MEDICINE

## 2023-10-13 PROCEDURE — 93306 TTE W/DOPPLER COMPLETE: CPT | Mod: PO

## 2023-10-13 PROCEDURE — 93306 ECHO (CUPID ONLY): ICD-10-PCS | Mod: 26,,, | Performed by: INTERNAL MEDICINE

## 2023-10-13 PROCEDURE — 78452 NUCLEAR STRESS - CARDIOLOGY INTERPRETED (CUPID ONLY): ICD-10-PCS | Mod: 26,,, | Performed by: INTERNAL MEDICINE

## 2023-10-13 PROCEDURE — 93880 EXTRACRANIAL BILAT STUDY: CPT | Mod: PO

## 2023-10-13 PROCEDURE — 78452 HT MUSCLE IMAGE SPECT MULT: CPT | Mod: PO

## 2023-10-13 PROCEDURE — 93226 XTRNL ECG REC<48 HR SCAN A/R: CPT | Mod: PO

## 2023-10-13 PROCEDURE — 93018 CV STRESS TEST I&R ONLY: CPT | Mod: ,,, | Performed by: INTERNAL MEDICINE

## 2023-10-13 PROCEDURE — 93306 TTE W/DOPPLER COMPLETE: CPT | Mod: 26,,, | Performed by: INTERNAL MEDICINE

## 2023-10-13 PROCEDURE — 93880 CV US DOPPLER CAROTID (CUPID ONLY): ICD-10-PCS | Mod: 26,,, | Performed by: INTERNAL MEDICINE

## 2023-10-13 PROCEDURE — 93018 PR CARDIAC STRESS TST,INTERP/REPT ONLY: ICD-10-PCS | Mod: ,,, | Performed by: INTERNAL MEDICINE

## 2023-10-13 PROCEDURE — 93017 CV STRESS TEST TRACING ONLY: CPT | Mod: PO

## 2023-10-13 PROCEDURE — 63600175 PHARM REV CODE 636 W HCPCS: Mod: PO | Performed by: INTERNAL MEDICINE

## 2023-10-13 RX ORDER — REGADENOSON 0.08 MG/ML
0.4 INJECTION, SOLUTION INTRAVENOUS
Status: COMPLETED | OUTPATIENT
Start: 2023-10-13 | End: 2023-10-13

## 2023-10-13 RX ADMIN — REGADENOSON 0.4 MG: 0.08 INJECTION, SOLUTION INTRAVENOUS at 10:10

## 2023-10-16 LAB
LEFT ARM DIASTOLIC BLOOD PRESSURE: 88 MMHG
LEFT ARM SYSTOLIC BLOOD PRESSURE: 130 MMHG
LEFT CBA DIAS: 32 CM/S
LEFT CBA SYS: 73 CM/S
LEFT CCA DIST DIAS: 42 CM/S
LEFT CCA DIST SYS: 113 CM/S
LEFT CCA MID DIAS: 39 CM/S
LEFT CCA MID SYS: 117 CM/S
LEFT CCA PROX DIAS: 31 CM/S
LEFT CCA PROX SYS: 119 CM/S
LEFT ECA DIAS: 20 CM/S
LEFT ECA SYS: 78 CM/S
LEFT ICA DIST DIAS: 48 CM/S
LEFT ICA DIST SYS: 103 CM/S
LEFT ICA MID DIAS: 56 CM/S
LEFT ICA MID SYS: 107 CM/S
LEFT ICA PROX DIAS: 40 CM/S
LEFT ICA PROX SYS: 101 CM/S
LEFT VERTEBRAL DIAS: 20 CM/S
LEFT VERTEBRAL SYS: 56 CM/S
OHS CV CAROTID RIGHT ICA EDV HIGHEST: 46
OHS CV CAROTID ULTRASOUND LEFT ICA/CCA RATIO: 0.95
OHS CV CAROTID ULTRASOUND RIGHT ICA/CCA RATIO: 1.15
OHS CV PV CAROTID LEFT HIGHEST CCA: 119
OHS CV PV CAROTID LEFT HIGHEST ICA: 107
OHS CV PV CAROTID RIGHT HIGHEST CCA: 109
OHS CV PV CAROTID RIGHT HIGHEST ICA: 102
OHS CV US CAROTID LEFT HIGHEST EDV: 56
RIGHT ARM DIASTOLIC BLOOD PRESSURE: 88 MMHG
RIGHT ARM SYSTOLIC BLOOD PRESSURE: 130 MMHG
RIGHT CBA DIAS: 29 CM/S
RIGHT CBA SYS: 78 CM/S
RIGHT CCA DIST DIAS: 41 CM/S
RIGHT CCA DIST SYS: 89 CM/S
RIGHT CCA MID DIAS: 25 CM/S
RIGHT CCA MID SYS: 100 CM/S
RIGHT CCA PROX DIAS: 41 CM/S
RIGHT CCA PROX SYS: 109 CM/S
RIGHT ECA DIAS: 21 CM/S
RIGHT ECA SYS: 90 CM/S
RIGHT ICA DIST DIAS: 46 CM/S
RIGHT ICA DIST SYS: 102 CM/S
RIGHT ICA MID DIAS: 46 CM/S
RIGHT ICA MID SYS: 96 CM/S
RIGHT ICA PROX DIAS: 45 CM/S
RIGHT ICA PROX SYS: 88 CM/S
RIGHT VERTEBRAL DIAS: 28 CM/S
RIGHT VERTEBRAL SYS: 66 CM/S

## 2023-10-19 DIAGNOSIS — G43.819 OTHER MIGRAINE WITHOUT STATUS MIGRAINOSUS, INTRACTABLE: ICD-10-CM

## 2023-10-19 RX ORDER — BUTALBITAL, ACETAMINOPHEN AND CAFFEINE 50; 325; 40 MG/1; MG/1; MG/1
1 TABLET ORAL EVERY 6 HOURS PRN
Qty: 40 TABLET | Refills: 3 | Status: SHIPPED | OUTPATIENT
Start: 2023-10-19 | End: 2024-01-19 | Stop reason: SDUPTHER

## 2023-10-20 ENCOUNTER — OFFICE VISIT (OUTPATIENT)
Dept: PRIMARY CARE CLINIC | Facility: CLINIC | Age: 48
End: 2023-10-20
Payer: MEDICAID

## 2023-10-20 VITALS
DIASTOLIC BLOOD PRESSURE: 76 MMHG | OXYGEN SATURATION: 97 % | BODY MASS INDEX: 33.41 KG/M2 | HEART RATE: 106 BPM | WEIGHT: 171.06 LBS | RESPIRATION RATE: 18 BRPM | SYSTOLIC BLOOD PRESSURE: 114 MMHG

## 2023-10-20 DIAGNOSIS — M54.12 CERVICAL RADICULAR PAIN: ICD-10-CM

## 2023-10-20 DIAGNOSIS — G43.819 OTHER MIGRAINE WITHOUT STATUS MIGRAINOSUS, INTRACTABLE: ICD-10-CM

## 2023-10-20 DIAGNOSIS — M54.9 DORSALGIA, UNSPECIFIED: ICD-10-CM

## 2023-10-20 DIAGNOSIS — M54.31 SCIATICA OF RIGHT SIDE: ICD-10-CM

## 2023-10-20 DIAGNOSIS — F41.9 ANXIETY: ICD-10-CM

## 2023-10-20 PROCEDURE — 3074F SYST BP LT 130 MM HG: CPT | Mod: CPTII,S$GLB,, | Performed by: PHYSICIAN ASSISTANT

## 2023-10-20 PROCEDURE — 1160F PR REVIEW ALL MEDS BY PRESCRIBER/CLIN PHARMACIST DOCUMENTED: ICD-10-PCS | Mod: CPTII,S$GLB,, | Performed by: PHYSICIAN ASSISTANT

## 2023-10-20 PROCEDURE — 3008F BODY MASS INDEX DOCD: CPT | Mod: CPTII,S$GLB,, | Performed by: PHYSICIAN ASSISTANT

## 2023-10-20 PROCEDURE — 1159F PR MEDICATION LIST DOCUMENTED IN MEDICAL RECORD: ICD-10-PCS | Mod: CPTII,S$GLB,, | Performed by: PHYSICIAN ASSISTANT

## 2023-10-20 PROCEDURE — 3008F PR BODY MASS INDEX (BMI) DOCUMENTED: ICD-10-PCS | Mod: CPTII,S$GLB,, | Performed by: PHYSICIAN ASSISTANT

## 2023-10-20 PROCEDURE — 1160F RVW MEDS BY RX/DR IN RCRD: CPT | Mod: CPTII,S$GLB,, | Performed by: PHYSICIAN ASSISTANT

## 2023-10-20 PROCEDURE — 3044F HG A1C LEVEL LT 7.0%: CPT | Mod: CPTII,S$GLB,, | Performed by: PHYSICIAN ASSISTANT

## 2023-10-20 PROCEDURE — 99214 PR OFFICE/OUTPT VISIT, EST, LEVL IV, 30-39 MIN: ICD-10-PCS | Mod: S$GLB,,, | Performed by: PHYSICIAN ASSISTANT

## 2023-10-20 PROCEDURE — 3078F PR MOST RECENT DIASTOLIC BLOOD PRESSURE < 80 MM HG: ICD-10-PCS | Mod: CPTII,S$GLB,, | Performed by: PHYSICIAN ASSISTANT

## 2023-10-20 PROCEDURE — 1159F MED LIST DOCD IN RCRD: CPT | Mod: CPTII,S$GLB,, | Performed by: PHYSICIAN ASSISTANT

## 2023-10-20 PROCEDURE — 3074F PR MOST RECENT SYSTOLIC BLOOD PRESSURE < 130 MM HG: ICD-10-PCS | Mod: CPTII,S$GLB,, | Performed by: PHYSICIAN ASSISTANT

## 2023-10-20 PROCEDURE — 3044F PR MOST RECENT HEMOGLOBIN A1C LEVEL <7.0%: ICD-10-PCS | Mod: CPTII,S$GLB,, | Performed by: PHYSICIAN ASSISTANT

## 2023-10-20 PROCEDURE — 99214 OFFICE O/P EST MOD 30 MIN: CPT | Mod: S$GLB,,, | Performed by: PHYSICIAN ASSISTANT

## 2023-10-20 PROCEDURE — 3078F DIAST BP <80 MM HG: CPT | Mod: CPTII,S$GLB,, | Performed by: PHYSICIAN ASSISTANT

## 2023-10-20 RX ORDER — DIAZEPAM 10 MG/1
10 TABLET ORAL 2 TIMES DAILY PRN
Qty: 60 TABLET | Refills: 2 | Status: SHIPPED | OUTPATIENT
Start: 2023-10-20 | End: 2024-01-11

## 2023-10-20 RX ORDER — HYDROCODONE BITARTRATE AND ACETAMINOPHEN 10; 325 MG/1; MG/1
1 TABLET ORAL EVERY 6 HOURS PRN
Qty: 20 TABLET | Refills: 0 | Status: SHIPPED | OUTPATIENT
Start: 2023-10-20 | End: 2023-10-20 | Stop reason: SDUPTHER

## 2023-10-20 RX ORDER — PROMETHAZINE HYDROCHLORIDE 25 MG/1
25 TABLET ORAL EVERY 6 HOURS PRN
Qty: 20 TABLET | Refills: 5 | Status: SHIPPED | OUTPATIENT
Start: 2023-10-20 | End: 2023-11-27 | Stop reason: SDUPTHER

## 2023-10-20 RX ORDER — VARENICLINE TARTRATE 0.5 (11)-1
KIT ORAL
Qty: 1 EACH | Refills: 0 | Status: SHIPPED | OUTPATIENT
Start: 2023-10-20 | End: 2024-01-19 | Stop reason: CLARIF

## 2023-10-20 RX ORDER — HYDROCODONE BITARTRATE AND ACETAMINOPHEN 10; 325 MG/1; MG/1
1 TABLET ORAL EVERY 6 HOURS PRN
Qty: 20 TABLET | Refills: 0 | Status: SHIPPED | OUTPATIENT
Start: 2023-10-20 | End: 2023-11-27 | Stop reason: SDUPTHER

## 2023-10-20 RX ORDER — PROMETHAZINE HYDROCHLORIDE 25 MG/1
25 TABLET ORAL EVERY 6 HOURS PRN
Qty: 20 TABLET | Refills: 5 | Status: SHIPPED | OUTPATIENT
Start: 2023-10-20 | End: 2023-10-20 | Stop reason: SDUPTHER

## 2023-10-20 RX ORDER — DIAZEPAM 10 MG/1
10 TABLET ORAL 2 TIMES DAILY PRN
Qty: 60 TABLET | Refills: 2 | Status: SHIPPED | OUTPATIENT
Start: 2023-10-20 | End: 2023-10-20 | Stop reason: SDUPTHER
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
## 2023-10-30 ENCOUNTER — TELEPHONE (OUTPATIENT)
Dept: PRIMARY CARE CLINIC | Facility: CLINIC | Age: 48
End: 2023-10-30
Payer: MEDICAID

## 2023-10-30 NOTE — TELEPHONE ENCOUNTER
----- Message from Reanna Rincon sent at 10/30/2023 10:50 AM CDT -----  Contact: Patient  Type:  Sooner Appointment Request    Caller is requesting a sooner appointment.  Caller declined first available appointment listed below.  Caller will not accept being placed on the waitlist and is requesting a message be sent to doctor.    Name of Caller:  Patient  When is the first available appointment?  N/A    Symptoms:  started swelling after last visit / legs and feet are swelling / losing feeling / pain from back all the way down / coughing up bright green and some blood / if sitting, will fall asleep    Would the patient rather a call back or a response via [facility]?  Call back  Best Call Back Number:  [phone]    Additional Information:   States she needs to speak with someone about these symptoms - states she doesn't know what's going on - states not sure if she should go to ED or not - please call - thank you

## 2023-10-30 NOTE — TELEPHONE ENCOUNTER
Returned pt call regarding Sx of not feeling well and falling asleep while sitting up, recommended pt to have someone drive her to the ER room. Pt voiced understanding

## 2023-11-01 ENCOUNTER — TELEPHONE (OUTPATIENT)
Dept: CARDIOLOGY | Facility: CLINIC | Age: 48
End: 2023-11-01
Payer: MEDICAID

## 2023-11-01 NOTE — TELEPHONE ENCOUNTER
----- Message from Sarita Johnson sent at 11/1/2023  8:40 AM CDT -----  Contact: self  Type:  Test Results    Who Called: Pt  Name of Test (Lab/Mammo/Etc): EKG, Stress, Nuclear, Echo  Date of Test: 10/13  Ordering Provider: Dr. Stone  Where the test was performed: [facility]  Would the patient rather a call back or a response via [facility]? call  Best Call Back Number: [phone]  Additional Information:   Pt has an appt today however, pt has Covid and would like to have someone call to consult her on test results... Thank you....

## 2023-11-01 NOTE — TELEPHONE ENCOUNTER
Spoke to pt and informed her stress test and ultrasound were normal per Dr Stone. She has not brought back monitor as she was in the hospital with colitis

## 2023-11-27 ENCOUNTER — OFFICE VISIT (OUTPATIENT)
Dept: PRIMARY CARE CLINIC | Facility: CLINIC | Age: 48
End: 2023-11-27
Payer: MEDICAID

## 2023-11-27 ENCOUNTER — TELEPHONE (OUTPATIENT)
Dept: PRIMARY CARE CLINIC | Facility: CLINIC | Age: 48
End: 2023-11-27

## 2023-11-27 VITALS
BODY MASS INDEX: 33.37 KG/M2 | DIASTOLIC BLOOD PRESSURE: 76 MMHG | HEART RATE: 68 BPM | TEMPERATURE: 98 F | SYSTOLIC BLOOD PRESSURE: 130 MMHG | RESPIRATION RATE: 18 BRPM | WEIGHT: 170.88 LBS | OXYGEN SATURATION: 97 %

## 2023-11-27 DIAGNOSIS — M54.9 DORSALGIA, UNSPECIFIED: ICD-10-CM

## 2023-11-27 DIAGNOSIS — M54.12 CERVICAL RADICULAR PAIN: ICD-10-CM

## 2023-11-27 DIAGNOSIS — M54.31 SCIATICA OF RIGHT SIDE: ICD-10-CM

## 2023-11-27 DIAGNOSIS — S62.632A DISPLACED FRACTURE OF DISTAL PHALANX OF RIGHT MIDDLE FINGER, INITIAL ENCOUNTER FOR CLOSED FRACTURE: Primary | ICD-10-CM

## 2023-11-27 PROCEDURE — 3008F BODY MASS INDEX DOCD: CPT | Mod: CPTII,S$GLB,, | Performed by: PHYSICIAN ASSISTANT

## 2023-11-27 PROCEDURE — 1160F PR REVIEW ALL MEDS BY PRESCRIBER/CLIN PHARMACIST DOCUMENTED: ICD-10-PCS | Mod: CPTII,S$GLB,, | Performed by: PHYSICIAN ASSISTANT

## 2023-11-27 PROCEDURE — 99214 PR OFFICE/OUTPT VISIT, EST, LEVL IV, 30-39 MIN: ICD-10-PCS | Mod: S$GLB,,, | Performed by: PHYSICIAN ASSISTANT

## 2023-11-27 PROCEDURE — 1159F PR MEDICATION LIST DOCUMENTED IN MEDICAL RECORD: ICD-10-PCS | Mod: CPTII,S$GLB,, | Performed by: PHYSICIAN ASSISTANT

## 2023-11-27 PROCEDURE — 3008F PR BODY MASS INDEX (BMI) DOCUMENTED: ICD-10-PCS | Mod: CPTII,S$GLB,, | Performed by: PHYSICIAN ASSISTANT

## 2023-11-27 PROCEDURE — 3044F HG A1C LEVEL LT 7.0%: CPT | Mod: CPTII,S$GLB,, | Performed by: PHYSICIAN ASSISTANT

## 2023-11-27 PROCEDURE — 3044F PR MOST RECENT HEMOGLOBIN A1C LEVEL <7.0%: ICD-10-PCS | Mod: CPTII,S$GLB,, | Performed by: PHYSICIAN ASSISTANT

## 2023-11-27 PROCEDURE — 1160F RVW MEDS BY RX/DR IN RCRD: CPT | Mod: CPTII,S$GLB,, | Performed by: PHYSICIAN ASSISTANT

## 2023-11-27 PROCEDURE — 3078F PR MOST RECENT DIASTOLIC BLOOD PRESSURE < 80 MM HG: ICD-10-PCS | Mod: CPTII,S$GLB,, | Performed by: PHYSICIAN ASSISTANT

## 2023-11-27 PROCEDURE — 1159F MED LIST DOCD IN RCRD: CPT | Mod: CPTII,S$GLB,, | Performed by: PHYSICIAN ASSISTANT

## 2023-11-27 PROCEDURE — 3078F DIAST BP <80 MM HG: CPT | Mod: CPTII,S$GLB,, | Performed by: PHYSICIAN ASSISTANT

## 2023-11-27 PROCEDURE — 99214 OFFICE O/P EST MOD 30 MIN: CPT | Mod: S$GLB,,, | Performed by: PHYSICIAN ASSISTANT

## 2023-11-27 PROCEDURE — 3075F SYST BP GE 130 - 139MM HG: CPT | Mod: CPTII,S$GLB,, | Performed by: PHYSICIAN ASSISTANT

## 2023-11-27 PROCEDURE — 3075F PR MOST RECENT SYSTOLIC BLOOD PRESS GE 130-139MM HG: ICD-10-PCS | Mod: CPTII,S$GLB,, | Performed by: PHYSICIAN ASSISTANT

## 2023-11-27 RX ORDER — HYDROCODONE BITARTRATE AND ACETAMINOPHEN 10; 325 MG/1; MG/1
1 TABLET ORAL EVERY 6 HOURS PRN
Qty: 20 TABLET | Refills: 0 | Status: SHIPPED | OUTPATIENT
Start: 2023-11-27 | End: 2024-01-19 | Stop reason: SDUPTHER

## 2023-11-27 RX ORDER — PROMETHAZINE HYDROCHLORIDE 25 MG/1
25 TABLET ORAL EVERY 6 HOURS PRN
Qty: 15 TABLET | Refills: 0 | Status: SHIPPED | OUTPATIENT
Start: 2023-11-27 | End: 2024-01-19

## 2023-11-27 RX ORDER — CEPHALEXIN 500 MG/1
500 CAPSULE ORAL EVERY 8 HOURS
Qty: 21 CAPSULE | Refills: 0 | Status: SHIPPED | OUTPATIENT
Start: 2023-11-27 | End: 2024-01-19

## 2023-11-27 NOTE — PROGRESS NOTES
Subjective     Patient ID: Wanda Hirsch is a 48 y.o. female.    Chief Complaint: Follow-up (ER follow up for broken finger)    Patient is a 49 yo female who fractured her right middle finger after a log fell on it. She was evaluated a [place] [facility] and treated. She was told to follow up with me and set up an orthro appt.     Follow-up  Associated symptoms include numbness. Pertinent negatives include no chest pain, chills, fatigue or fever.   Hand Injury   Her dominant hand is their right hand. The incident occurred 3 to 5 days ago. The incident occurred at home. The injury mechanism was a direct blow. The pain is present in the right fingers. The quality of the pain is described as aching. The pain is moderate. The pain has been Fluctuating since the incident. Associated symptoms include numbness and tingling. Pertinent negatives include no chest pain or muscle weakness. The symptoms are aggravated by movement, lifting and palpation. She has tried rest and immobilization (given Norco 5 which she is out) for the symptoms.     Review of Systems   Constitutional:  Negative for activity change, chills, fatigue and fever.   Respiratory:  Negative for chest tightness.    Cardiovascular:  Negative for chest pain.   Neurological:  Positive for tingling and numbness.          Objective     Physical Exam  Vitals reviewed.   Constitutional:       General: She is not in acute distress.     Appearance: Normal appearance. She is not ill-appearing, toxic-appearing or diaphoretic.   Cardiovascular:      Rate and Rhythm: Normal rate and regular rhythm.      Pulses: Normal pulses.      Heart sounds: Normal heart sounds. No murmur heard.     No friction rub. No gallop.   Pulmonary:      Effort: Pulmonary effort is normal.      Breath sounds: Normal breath sounds.   Abdominal:      Palpations: Abdomen is soft.      Tenderness: There is no abdominal tenderness.   Neurological:      Mental Status: She is alert.            Assessment  and Plan     1. Closed nondisplaced fracture of distal phalanx of left middle finger with routine healing, subsequent encounter  -     cephALEXin (KEFLEX) 500 MG capsule; Take 1 capsule (500 mg total) by mouth every 8 (eight) hours.  Dispense: 21 capsule; Refill: 0  -     HYDROcodone-acetaminophen (NORCO)  mg per tablet; Take 1 tablet by mouth every 6 (six) hours as needed for Pain.  Dispense: 20 tablet; Refill: 0  -     Ambulatory referral/consult to Orthopedics; Future; Expected date: 12/04/2023    2. Dorsalgia, unspecified  -     HYDROcodone-acetaminophen (NORCO)  mg per tablet; Take 1 tablet by mouth every 6 (six) hours as needed for Pain.  Dispense: 20 tablet; Refill: 0    3. Cervical radicular pain  -     HYDROcodone-acetaminophen (NORCO)  mg per tablet; Take 1 tablet by mouth every 6 (six) hours as needed for Pain.  Dispense: 20 tablet; Refill: 0    4. Sciatica of right side  -     HYDROcodone-acetaminophen (NORCO)  mg per tablet; Take 1 tablet by mouth every 6 (six) hours as needed for Pain.  Dispense: 20 tablet; Refill: 0        I spent 30 minutes on this encounter, time includes face-to-face, chart review, documentation, test review and orders.

## 2023-11-27 NOTE — TELEPHONE ENCOUNTER
Pt requesting prescription for phenergan sent to [facility]'s    Please advise

## 2023-11-27 NOTE — TELEPHONE ENCOUNTER
----- Message from Nuriaarlene Amy sent at 11/27/2023  9:25 AM CST -----  Regarding: Same Day Appt  Same Day Appointment Request      Caller is requesting a same day appointment.  Caller declined first available appointment listed below.      Name of Caller: Pt    When is the first available appointment? Rojelio      Best Call Back Number: [phone]      Additional Information: Sts went to the ER over the weekend after dropping a log on her hand and breaking her finger. Was told to be seen today.  Please Advise - Thank you

## 2024-01-11 DIAGNOSIS — F41.9 ANXIETY: ICD-10-CM

## 2024-01-11 RX ORDER — DIAZEPAM 10 MG/1
10 TABLET ORAL 2 TIMES DAILY PRN
Qty: 60 TABLET | Refills: 2 | Status: SHIPPED | OUTPATIENT
Start: 2024-01-11 | End: 2024-01-19 | Stop reason: SDUPTHER

## 2024-01-19 ENCOUNTER — OFFICE VISIT (OUTPATIENT)
Dept: PRIMARY CARE CLINIC | Facility: CLINIC | Age: 49
End: 2024-01-19
Payer: MEDICAID

## 2024-01-19 VITALS
BODY MASS INDEX: 31.86 KG/M2 | OXYGEN SATURATION: 97 % | HEART RATE: 78 BPM | HEIGHT: 60 IN | SYSTOLIC BLOOD PRESSURE: 126 MMHG | DIASTOLIC BLOOD PRESSURE: 66 MMHG | WEIGHT: 162.25 LBS

## 2024-01-19 DIAGNOSIS — M32.9 SYSTEMIC LUPUS ERYTHEMATOSUS, UNSPECIFIED SLE TYPE, UNSPECIFIED ORGAN INVOLVEMENT STATUS: ICD-10-CM

## 2024-01-19 DIAGNOSIS — G43.819 OTHER MIGRAINE WITHOUT STATUS MIGRAINOSUS, INTRACTABLE: ICD-10-CM

## 2024-01-19 DIAGNOSIS — E55.9 VITAMIN D DEFICIENCY DISEASE: ICD-10-CM

## 2024-01-19 DIAGNOSIS — J44.9 CHRONIC OBSTRUCTIVE PULMONARY DISEASE, UNSPECIFIED COPD TYPE: Primary | ICD-10-CM

## 2024-01-19 DIAGNOSIS — S62.632A DISPLACED FRACTURE OF DISTAL PHALANX OF RIGHT MIDDLE FINGER, INITIAL ENCOUNTER FOR CLOSED FRACTURE: ICD-10-CM

## 2024-01-19 DIAGNOSIS — I88.9 LYMPHADENITIS: ICD-10-CM

## 2024-01-19 DIAGNOSIS — F41.9 ANXIETY: ICD-10-CM

## 2024-01-19 DIAGNOSIS — M54.31 SCIATICA OF RIGHT SIDE: ICD-10-CM

## 2024-01-19 DIAGNOSIS — Z72.0 TOBACCO ABUSE: ICD-10-CM

## 2024-01-19 DIAGNOSIS — E78.5 HYPERLIPIDEMIA, UNSPECIFIED HYPERLIPIDEMIA TYPE: ICD-10-CM

## 2024-01-19 DIAGNOSIS — M54.12 CERVICAL RADICULAR PAIN: ICD-10-CM

## 2024-01-19 DIAGNOSIS — E53.8 B12 DEFICIENCY: ICD-10-CM

## 2024-01-19 DIAGNOSIS — I10 PRIMARY HYPERTENSION: ICD-10-CM

## 2024-01-19 LAB
BASOPHILS # BLD AUTO: 0.04 K/UL (ref 0–0.2)
BASOPHILS NFR BLD: 0.7 % (ref 0–1.9)
DIFFERENTIAL METHOD BLD: ABNORMAL
EOSINOPHIL # BLD AUTO: 0.1 K/UL (ref 0–0.5)
EOSINOPHIL NFR BLD: 1 % (ref 0–8)
ERYTHROCYTE [DISTWIDTH] IN BLOOD BY AUTOMATED COUNT: 12.6 % (ref 11.5–14.5)
HCT VFR BLD AUTO: 41.2 % (ref 37–48.5)
HGB BLD-MCNC: 13.7 G/DL (ref 12–16)
IMM GRANULOCYTES # BLD AUTO: 0.01 K/UL (ref 0–0.04)
IMM GRANULOCYTES NFR BLD AUTO: 0.2 % (ref 0–0.5)
LYMPHOCYTES # BLD AUTO: 2.6 K/UL (ref 1–4.8)
LYMPHOCYTES NFR BLD: 43.5 % (ref 18–48)
MCH RBC QN AUTO: 31.1 PG (ref 27–31)
MCHC RBC AUTO-ENTMCNC: 33.3 G/DL (ref 32–36)
MCV RBC AUTO: 94 FL (ref 82–98)
MONOCYTES # BLD AUTO: 0.3 K/UL (ref 0.3–1)
MONOCYTES NFR BLD: 4.7 % (ref 4–15)
NEUTROPHILS # BLD AUTO: 3 K/UL (ref 1.8–7.7)
NEUTROPHILS NFR BLD: 49.9 % (ref 38–73)
NRBC BLD-RTO: 0 /100 WBC
PLATELET # BLD AUTO: 220 K/UL (ref 150–450)
PMV BLD AUTO: 12.9 FL (ref 9.2–12.9)
RBC # BLD AUTO: 4.4 M/UL (ref 4–5.4)
WBC # BLD AUTO: 5.96 K/UL (ref 3.9–12.7)

## 2024-01-19 PROCEDURE — 80061 LIPID PANEL: CPT | Performed by: PHYSICIAN ASSISTANT

## 2024-01-19 PROCEDURE — 3008F BODY MASS INDEX DOCD: CPT | Mod: CPTII,S$GLB,, | Performed by: PHYSICIAN ASSISTANT

## 2024-01-19 PROCEDURE — 99215 OFFICE O/P EST HI 40 MIN: CPT | Mod: S$GLB,,, | Performed by: PHYSICIAN ASSISTANT

## 2024-01-19 PROCEDURE — 3078F DIAST BP <80 MM HG: CPT | Mod: CPTII,S$GLB,, | Performed by: PHYSICIAN ASSISTANT

## 2024-01-19 PROCEDURE — 85025 COMPLETE CBC W/AUTO DIFF WBC: CPT | Performed by: PHYSICIAN ASSISTANT

## 2024-01-19 PROCEDURE — 80053 COMPREHEN METABOLIC PANEL: CPT | Performed by: PHYSICIAN ASSISTANT

## 2024-01-19 PROCEDURE — 1159F MED LIST DOCD IN RCRD: CPT | Mod: CPTII,S$GLB,, | Performed by: PHYSICIAN ASSISTANT

## 2024-01-19 PROCEDURE — 3074F SYST BP LT 130 MM HG: CPT | Mod: CPTII,S$GLB,, | Performed by: PHYSICIAN ASSISTANT

## 2024-01-19 PROCEDURE — 36415 COLL VENOUS BLD VENIPUNCTURE: CPT | Mod: S$GLB,,, | Performed by: PHYSICIAN ASSISTANT

## 2024-01-19 PROCEDURE — 82607 VITAMIN B-12: CPT | Performed by: PHYSICIAN ASSISTANT

## 2024-01-19 RX ORDER — TIZANIDINE 4 MG/1
4 TABLET ORAL EVERY 8 HOURS PRN
COMMUNITY
Start: 2023-12-29 | End: 2024-01-19

## 2024-01-19 RX ORDER — VARENICLINE TARTRATE 1 MG/1
1 TABLET, FILM COATED ORAL 2 TIMES DAILY
Qty: 60 TABLET | Refills: 3 | Status: SHIPPED | OUTPATIENT
Start: 2024-01-19 | End: 2024-03-22

## 2024-01-19 RX ORDER — BUTALBITAL, ACETAMINOPHEN AND CAFFEINE 50; 325; 40 MG/1; MG/1; MG/1
1 TABLET ORAL EVERY 6 HOURS PRN
Qty: 40 TABLET | Refills: 3 | Status: SHIPPED | OUTPATIENT
Start: 2024-01-19 | End: 2024-04-25

## 2024-01-19 RX ORDER — ONDANSETRON 4 MG/1
4 TABLET, ORALLY DISINTEGRATING ORAL EVERY 8 HOURS
COMMUNITY
Start: 2024-01-08 | End: 2024-03-22

## 2024-01-19 RX ORDER — DOXYCYCLINE 100 MG/1
100 CAPSULE ORAL EVERY 12 HOURS
Qty: 20 CAPSULE | Refills: 0 | Status: SHIPPED | OUTPATIENT
Start: 2024-01-19 | End: 2024-04-15

## 2024-01-19 RX ORDER — DIAZEPAM 10 MG/1
10 TABLET ORAL 2 TIMES DAILY PRN
Qty: 60 TABLET | Refills: 2 | Status: SHIPPED | OUTPATIENT
Start: 2024-01-19

## 2024-01-19 RX ORDER — ERGOCALCIFEROL 1.25 MG/1
50000 CAPSULE ORAL
Qty: 12 CAPSULE | Refills: 3 | Status: SHIPPED | OUTPATIENT
Start: 2024-01-19

## 2024-01-19 RX ORDER — HYDROCODONE BITARTRATE AND ACETAMINOPHEN 10; 325 MG/1; MG/1
1 TABLET ORAL EVERY 6 HOURS PRN
Qty: 20 TABLET | Refills: 0 | Status: SHIPPED | OUTPATIENT
Start: 2024-01-19 | End: 2024-03-22 | Stop reason: SDUPTHER

## 2024-01-19 RX ORDER — HYDROXYZINE HYDROCHLORIDE 50 MG/1
50 TABLET, FILM COATED ORAL 3 TIMES DAILY
Qty: 270 TABLET | Refills: 3 | Status: SHIPPED | OUTPATIENT
Start: 2024-01-19

## 2024-01-19 RX ORDER — ALBUTEROL SULFATE 90 UG/1
2 AEROSOL, METERED RESPIRATORY (INHALATION) EVERY 6 HOURS PRN
Qty: 18 G | Refills: 0 | Status: SHIPPED | OUTPATIENT
Start: 2024-01-19 | End: 2025-01-18

## 2024-01-19 RX ORDER — METHOCARBAMOL 500 MG/1
500 TABLET, FILM COATED ORAL 3 TIMES DAILY
Qty: 270 TABLET | Refills: 3 | Status: SHIPPED | OUTPATIENT
Start: 2024-01-19

## 2024-01-19 RX ORDER — FLUTICASONE FUROATE, UMECLIDINIUM BROMIDE AND VILANTEROL TRIFENATATE 200; 62.5; 25 UG/1; UG/1; UG/1
1 POWDER RESPIRATORY (INHALATION) DAILY
Qty: 60 EACH | Refills: 6 | Status: SHIPPED | OUTPATIENT
Start: 2024-01-19

## 2024-01-19 RX ORDER — VARENICLINE TARTRATE 0.5 (11)-1
KIT ORAL
Qty: 1 EACH | Refills: 0 | Status: SHIPPED | OUTPATIENT
Start: 2024-01-19 | End: 2024-03-22

## 2024-01-19 RX ORDER — CYANOCOBALAMIN 1000 UG/ML
1000 INJECTION, SOLUTION INTRAMUSCULAR; SUBCUTANEOUS
Qty: 10 ML | Refills: 0 | Status: SHIPPED | OUTPATIENT
Start: 2024-01-19

## 2024-01-19 NOTE — PROGRESS NOTES
Subjective     Patient ID: Wanda Hirsch is a 48 y.o. female.    Chief Complaint: Follow-up    Patient is a 49 yo female who comes in today for a check up.     Patient Active Problem List:     Systemic lupus erythematosus stable. Has been unable to see an Rheumatologist due to insurance     COPD (chronic obstructive pulmonary disease): Stable on medication      Tobacco abuse: continuous. Rx of chantex recently ran out     B12 deficiency     Vitamin D deficiency disease     Hypertension: Bp controlled. Has los weight recently     Anxiety: has been increased to recent death of her Father     Intractable migraine without status migrainosus: stable with meds     NAFLD (nonalcoholic fatty liver disease)     Hyperlipidemia     BMI 33.0-33.9,adult     Generalized edema     Cervical radicular pain reports increased pain today      Sciatica of right side    Past Medical History:  No date: Anxiety  No date: Arthritis  No date: COPD (chronic obstructive pulmonary disease)  No date: COVID-19      Comment:  sept 2021  No date: IBS (irritable bowel syndrome)  No date: Interstitial cystitis (chronic) with hematuria      Comment:  per patient  No date: Lupus      Comment:  per patient  No date: Migraine headache  No date: Mixed connective tissue disease      Comment:  per patient  No date: Nephrolithiasis      Follow-up  Associated symptoms include arthralgias, myalgias, neck pain and a sore throat. Pertinent negatives include no abdominal pain, chest pain, chills, fatigue, nausea, numbness, vertigo or weakness.     Review of Systems   Constitutional:  Negative for activity change, chills, fatigue and unexpected weight change.   HENT:  Positive for sinus pressure/congestion and sore throat.    Eyes: Negative.    Respiratory:  Positive for wheezing. Negative for chest tightness and shortness of breath.         At basle line   Cardiovascular:  Negative for chest pain, palpitations and leg swelling.   Gastrointestinal:  Negative for  abdominal pain, blood in stool, diarrhea and nausea.   Genitourinary: Negative.    Musculoskeletal:  Positive for arthralgias, back pain, myalgias, neck pain and neck stiffness.   Integumentary:  Negative.   Neurological:  Negative for dizziness, vertigo, seizures, weakness and numbness.   Psychiatric/Behavioral:  Positive for dysphoric mood.           Objective     Physical Exam  Vitals reviewed.   Constitutional:       General: She is not in acute distress.     Appearance: Normal appearance. She is not ill-appearing, toxic-appearing or diaphoretic.   HENT:      Head: Normocephalic and atraumatic.      Right Ear: Tympanic membrane, ear canal and external ear normal. There is no impacted cerumen.      Left Ear: Tympanic membrane, ear canal and external ear normal. There is no impacted cerumen.      Nose: Nose normal. No congestion or rhinorrhea.      Mouth/Throat:      Pharynx: No oropharyngeal exudate or posterior oropharyngeal erythema.   Eyes:      Conjunctiva/sclera: Conjunctivae normal.   Neck:      Vascular: No carotid bruit.   Cardiovascular:      Rate and Rhythm: Normal rate and regular rhythm.      Pulses: Normal pulses.      Heart sounds: Normal heart sounds. No murmur heard.     No friction rub. No gallop.   Pulmonary:      Effort: Pulmonary effort is normal. No respiratory distress.      Breath sounds: No stridor. Wheezing present. No rhonchi or rales.   Chest:      Chest wall: No tenderness.   Abdominal:      General: There is no distension.      Palpations: Abdomen is soft. There is no mass.      Tenderness: There is no abdominal tenderness. There is no right CVA tenderness, left CVA tenderness, guarding or rebound.      Hernia: No hernia is present.   Musculoskeletal:         General: No swelling.      Cervical back: No rigidity or tenderness.   Lymphadenopathy:      Cervical: Cervical adenopathy present.   Skin:     Coloration: Skin is not jaundiced.   Neurological:      Mental Status: She is alert.             Assessment and Plan     Chronic obstructive pulmonary disease, unspecified COPD type  -     albuterol (PROVENTIL/VENTOLIN HFA) 90 mcg/actuation inhaler; Inhale 2 puffs into the lungs every 6 (six) hours as needed for Wheezing. Rescue  Dispense: 18 g; Refill: 0  -     fluticasone-umeclidin-vilanter (TRELEGY ELLIPTA) 200-62.5-25 mcg inhaler; Inhale 1 puff into the lungs once daily.  Dispense: 60 each; Refill: 6    Primary hypertension    Hyperlipidemia, unspecified hyperlipidemia type  -     Comprehensive Metabolic Panel; Future; Expected date: 01/19/2024  -     Lipid Panel; Future; Expected date: 01/19/2024    Lymphadenitis  -     doxycycline (VIBRAMYCIN) 100 MG Cap; Take 1 capsule (100 mg total) by mouth every 12 (twelve) hours.  Dispense: 20 capsule; Refill: 0  -     Ambulatory referral/consult to ENT; Future; Expected date: 01/26/2024  -     CBC W/ AUTO DIFFERENTIAL; Future; Expected date: 01/19/2024    Other migraine without status migrainosus, intractable  -     butalbital-acetaminophen-caffeine -40 mg (FIORICET, ESGIC) -40 mg per tablet; Take 1 tablet by mouth every 6 (six) hours as needed for Headaches.  Dispense: 40 tablet; Refill: 3    B12 deficiency  -     cyanocobalamin 1,000 mcg/mL injection; Inject 1 mL (1,000 mcg total) into the muscle every 30 days.  Dispense: 10 mL; Refill: 0  -     Vitamin B12; Future; Expected date: 01/19/2024    Anxiety  -     diazePAM (VALIUM) 10 MG Tab; Take 1 tablet (10 mg total) by mouth 2 (two) times daily as needed (anxiety).  Dispense: 60 tablet; Refill: 2  -     hydrOXYzine (ATARAX) 50 MG tablet; Take 1 tablet (50 mg total) by mouth 3 (three) times daily.  Dispense: 270 tablet; Refill: 3    Vitamin D deficiency disease  -     ergocalciferol (ERGOCALCIFEROL) 50,000 unit Cap; Take 1 capsule (50,000 Units total) by mouth every 7 days.  Dispense: 12 capsule; Refill: 3    Systemic lupus erythematosus, unspecified SLE type, unspecified organ involvement  status  -     methocarbamoL (ROBAXIN) 500 MG Tab; Take 1 tablet (500 mg total) by mouth 3 (three) times daily.  Dispense: 270 tablet; Refill: 3    Cervical radicular pain  -     HYDROcodone-acetaminophen (NORCO)  mg per tablet; Take 1 tablet by mouth every 6 (six) hours as needed for Pain.  Dispense: 20 tablet; Refill: 0    Sciatica of right side  -     HYDROcodone-acetaminophen (NORCO)  mg per tablet; Take 1 tablet by mouth every 6 (six) hours as needed for Pain.  Dispense: 20 tablet; Refill: 0    Displaced fracture of distal phalanx of right middle finger, initial encounter for closed fracture  -     HYDROcodone-acetaminophen (NORCO)  mg per tablet; Take 1 tablet by mouth every 6 (six) hours as needed for Pain.  Dispense: 20 tablet; Refill: 0    Tobacco abuse  -     varenicline (CHANTIX STARTING MONTH BOX) 0.5 mg (11)- 1 mg (42) tablet; Take one 0.5mg tab by mouth once daily X3 days,then increase to one 0.5mg tab twice daily X4 days,then increase to one 1mg tab twice daily  Dispense: 1 each; Refill: 0  -     varenicline (CHANTIX) 1 mg Tab; Take 1 tablet (1 mg total) by mouth 2 (two) times daily.  Dispense: 60 tablet; Refill: 3     I spent 30 minutes on this encounter, time includes face-to-face, chart review, documentation, test review and orders.

## 2024-01-20 LAB
ALBUMIN SERPL BCP-MCNC: 3.8 G/DL (ref 3.5–5.2)
ALP SERPL-CCNC: 134 U/L (ref 55–135)
ALT SERPL W/O P-5'-P-CCNC: 22 U/L (ref 10–44)
ANION GAP SERPL CALC-SCNC: 11 MMOL/L (ref 8–16)
AST SERPL-CCNC: 20 U/L (ref 10–40)
BILIRUB SERPL-MCNC: 0.4 MG/DL (ref 0.1–1)
BUN SERPL-MCNC: 6 MG/DL (ref 6–20)
CALCIUM SERPL-MCNC: 8.8 MG/DL (ref 8.7–10.5)
CHLORIDE SERPL-SCNC: 109 MMOL/L (ref 95–110)
CHOLEST SERPL-MCNC: 245 MG/DL (ref 120–199)
CHOLEST/HDLC SERPL: 6.4 {RATIO} (ref 2–5)
CO2 SERPL-SCNC: 20 MMOL/L (ref 23–29)
CREAT SERPL-MCNC: 0.6 MG/DL (ref 0.5–1.4)
EST. GFR  (NO RACE VARIABLE): >60 ML/MIN/1.73 M^2
GLUCOSE SERPL-MCNC: 113 MG/DL (ref 70–110)
HDLC SERPL-MCNC: 38 MG/DL (ref 40–75)
HDLC SERPL: 15.5 % (ref 20–50)
LDLC SERPL CALC-MCNC: 189.2 MG/DL (ref 63–159)
NONHDLC SERPL-MCNC: 207 MG/DL
POTASSIUM SERPL-SCNC: 4.1 MMOL/L (ref 3.5–5.1)
PROT SERPL-MCNC: 6.7 G/DL (ref 6–8.4)
SODIUM SERPL-SCNC: 140 MMOL/L (ref 136–145)
TRIGL SERPL-MCNC: 89 MG/DL (ref 30–150)
VIT B12 SERPL-MCNC: 794 PG/ML (ref 210–950)

## 2024-01-22 DIAGNOSIS — E78.5 HYPERLIPIDEMIA, UNSPECIFIED HYPERLIPIDEMIA TYPE: Primary | ICD-10-CM

## 2024-01-22 RX ORDER — EPINEPHRINE 0.22MG
100 AEROSOL WITH ADAPTER (ML) INHALATION DAILY
Qty: 90 CAPSULE | Refills: 3 | Status: SHIPPED | OUTPATIENT
Start: 2024-01-22 | End: 2025-01-21

## 2024-01-22 RX ORDER — ROSUVASTATIN CALCIUM 20 MG/1
20 TABLET, COATED ORAL DAILY
Qty: 90 TABLET | Refills: 3 | Status: SHIPPED | OUTPATIENT
Start: 2024-01-22 | End: 2025-01-21

## 2024-03-19 ENCOUNTER — PATIENT MESSAGE (OUTPATIENT)
Dept: ADMINISTRATIVE | Facility: HOSPITAL | Age: 49
End: 2024-03-19
Payer: MEDICAID

## 2024-03-19 ENCOUNTER — TELEPHONE (OUTPATIENT)
Dept: PRIMARY CARE CLINIC | Facility: CLINIC | Age: 49
End: 2024-03-19
Payer: MEDICAID

## 2024-03-19 NOTE — TELEPHONE ENCOUNTER
----- Message from Patsy Stephens sent at 3/19/2024  9:20 AM CDT -----  Regarding: appt access  Type:  Sooner Appointment Request    Caller is requesting a sooner appointment.  Caller declined first available appointment listed below.  Caller will not accept being placed on the waitlist and is requesting a message be sent to doctor.    Name of Caller:  pt   When is the first available appointment?  unk  Symptoms:  ed f/u / can't turn neck , every time she does she vomits , memory problems   Best Call Back Number:  [phone] (home)     Additional Information:  requesting a appt and call back asap requesting tomorrow or Friday. Pt had covid 2 or 3 weeks ago   please advise thank you

## 2024-03-20 DIAGNOSIS — Z12.11 SCREENING FOR COLON CANCER: ICD-10-CM

## 2024-03-21 DIAGNOSIS — Z12.31 OTHER SCREENING MAMMOGRAM: ICD-10-CM

## 2024-03-22 ENCOUNTER — OFFICE VISIT (OUTPATIENT)
Dept: PRIMARY CARE CLINIC | Facility: CLINIC | Age: 49
End: 2024-03-22
Payer: MEDICAID

## 2024-03-22 VITALS
HEART RATE: 78 BPM | RESPIRATION RATE: 18 BRPM | SYSTOLIC BLOOD PRESSURE: 120 MMHG | BODY MASS INDEX: 31.3 KG/M2 | OXYGEN SATURATION: 97 % | DIASTOLIC BLOOD PRESSURE: 80 MMHG | WEIGHT: 160.25 LBS

## 2024-03-22 DIAGNOSIS — M54.12 CERVICAL RADICULAR PAIN: ICD-10-CM

## 2024-03-22 DIAGNOSIS — R41.3 MEMORY LOSS: ICD-10-CM

## 2024-03-22 DIAGNOSIS — M54.31 SCIATICA OF RIGHT SIDE: Primary | ICD-10-CM

## 2024-03-22 DIAGNOSIS — Z72.0 TOBACCO ABUSE: ICD-10-CM

## 2024-03-22 DIAGNOSIS — R42 DIZZINESS AND GIDDINESS: ICD-10-CM

## 2024-03-22 LAB
ALBUMIN SERPL BCP-MCNC: 4 G/DL (ref 3.5–5.2)
ALP SERPL-CCNC: 154 U/L (ref 55–135)
ALT SERPL W/O P-5'-P-CCNC: 20 U/L (ref 10–44)
AMPHET+METHAMPHET UR QL: NEGATIVE
ANION GAP SERPL CALC-SCNC: 8 MMOL/L (ref 8–16)
AST SERPL-CCNC: 19 U/L (ref 10–40)
BARBITURATES UR QL SCN>200 NG/ML: ABNORMAL
BASOPHILS # BLD AUTO: 0.04 K/UL (ref 0–0.2)
BASOPHILS NFR BLD: 0.6 % (ref 0–1.9)
BENZODIAZ UR QL SCN>200 NG/ML: ABNORMAL
BILIRUB SERPL-MCNC: 0.3 MG/DL (ref 0.1–1)
BUN SERPL-MCNC: 8 MG/DL (ref 6–20)
BZE UR QL SCN: NEGATIVE
CALCIUM SERPL-MCNC: 9 MG/DL (ref 8.7–10.5)
CANNABINOIDS UR QL SCN: ABNORMAL
CHLORIDE SERPL-SCNC: 110 MMOL/L (ref 95–110)
CO2 SERPL-SCNC: 21 MMOL/L (ref 23–29)
CREAT SERPL-MCNC: 0.6 MG/DL (ref 0.5–1.4)
CREAT UR-MCNC: 128 MG/DL (ref 15–325)
DIFFERENTIAL METHOD BLD: ABNORMAL
EOSINOPHIL # BLD AUTO: 0.1 K/UL (ref 0–0.5)
EOSINOPHIL NFR BLD: 1.3 % (ref 0–8)
ERYTHROCYTE [DISTWIDTH] IN BLOOD BY AUTOMATED COUNT: 12.4 % (ref 11.5–14.5)
EST. GFR  (NO RACE VARIABLE): >60 ML/MIN/1.73 M^2
ETHANOL UR-MCNC: <10 MG/DL
GLUCOSE SERPL-MCNC: 104 MG/DL (ref 70–110)
HCT VFR BLD AUTO: 42.6 % (ref 37–48.5)
HGB BLD-MCNC: 14.6 G/DL (ref 12–16)
IMM GRANULOCYTES # BLD AUTO: 0.01 K/UL (ref 0–0.04)
IMM GRANULOCYTES NFR BLD AUTO: 0.2 % (ref 0–0.5)
LYMPHOCYTES # BLD AUTO: 3 K/UL (ref 1–4.8)
LYMPHOCYTES NFR BLD: 47.4 % (ref 18–48)
MCH RBC QN AUTO: 31.7 PG (ref 27–31)
MCHC RBC AUTO-ENTMCNC: 34.3 G/DL (ref 32–36)
MCV RBC AUTO: 92 FL (ref 82–98)
METHADONE UR QL SCN>300 NG/ML: NEGATIVE
MONOCYTES # BLD AUTO: 0.3 K/UL (ref 0.3–1)
MONOCYTES NFR BLD: 5 % (ref 4–15)
NEUTROPHILS # BLD AUTO: 2.8 K/UL (ref 1.8–7.7)
NEUTROPHILS NFR BLD: 45.5 % (ref 38–73)
NRBC BLD-RTO: 0 /100 WBC
OPIATES UR QL SCN: NEGATIVE
PCP UR QL SCN>25 NG/ML: NEGATIVE
PLATELET # BLD AUTO: 187 K/UL (ref 150–450)
PMV BLD AUTO: 13.3 FL (ref 9.2–12.9)
POTASSIUM SERPL-SCNC: 4 MMOL/L (ref 3.5–5.1)
PROT SERPL-MCNC: 7.1 G/DL (ref 6–8.4)
RBC # BLD AUTO: 4.61 M/UL (ref 4–5.4)
SODIUM SERPL-SCNC: 139 MMOL/L (ref 136–145)
TOXICOLOGY INFORMATION: ABNORMAL
TSH SERPL DL<=0.005 MIU/L-ACNC: 1.59 UIU/ML (ref 0.4–4)
WBC # BLD AUTO: 6.24 K/UL (ref 3.9–12.7)

## 2024-03-22 PROCEDURE — 3074F SYST BP LT 130 MM HG: CPT | Mod: CPTII,S$GLB,, | Performed by: PHYSICIAN ASSISTANT

## 2024-03-22 PROCEDURE — 99214 OFFICE O/P EST MOD 30 MIN: CPT | Mod: S$GLB,,, | Performed by: PHYSICIAN ASSISTANT

## 2024-03-22 PROCEDURE — 82300 ASSAY OF CADMIUM: CPT | Performed by: PHYSICIAN ASSISTANT

## 2024-03-22 PROCEDURE — 85025 COMPLETE CBC W/AUTO DIFF WBC: CPT | Performed by: PHYSICIAN ASSISTANT

## 2024-03-22 PROCEDURE — 80053 COMPREHEN METABOLIC PANEL: CPT | Performed by: PHYSICIAN ASSISTANT

## 2024-03-22 PROCEDURE — 84443 ASSAY THYROID STIM HORMONE: CPT | Performed by: PHYSICIAN ASSISTANT

## 2024-03-22 PROCEDURE — 80307 DRUG TEST PRSMV CHEM ANLYZR: CPT | Performed by: PHYSICIAN ASSISTANT

## 2024-03-22 PROCEDURE — 1159F MED LIST DOCD IN RCRD: CPT | Mod: CPTII,S$GLB,, | Performed by: PHYSICIAN ASSISTANT

## 2024-03-22 PROCEDURE — 1160F RVW MEDS BY RX/DR IN RCRD: CPT | Mod: CPTII,S$GLB,, | Performed by: PHYSICIAN ASSISTANT

## 2024-03-22 PROCEDURE — 3079F DIAST BP 80-89 MM HG: CPT | Mod: CPTII,S$GLB,, | Performed by: PHYSICIAN ASSISTANT

## 2024-03-22 PROCEDURE — 3008F BODY MASS INDEX DOCD: CPT | Mod: CPTII,S$GLB,, | Performed by: PHYSICIAN ASSISTANT

## 2024-03-22 PROCEDURE — 36415 COLL VENOUS BLD VENIPUNCTURE: CPT | Mod: S$GLB,,, | Performed by: PHYSICIAN ASSISTANT

## 2024-03-22 RX ORDER — HYDROCODONE BITARTRATE AND ACETAMINOPHEN 10; 325 MG/1; MG/1
1 TABLET ORAL EVERY 6 HOURS PRN
Qty: 20 TABLET | Refills: 0 | Status: SHIPPED | OUTPATIENT
Start: 2024-03-22 | End: 2024-04-15

## 2024-03-22 RX ORDER — VARENICLINE TARTRATE 1 MG/1
1 TABLET, FILM COATED ORAL 2 TIMES DAILY
Qty: 60 TABLET | Refills: 3 | Status: SHIPPED | OUTPATIENT
Start: 2024-03-22

## 2024-03-22 RX ORDER — PROMETHAZINE HYDROCHLORIDE 25 MG/1
25 TABLET ORAL EVERY 6 HOURS PRN
Qty: 15 TABLET | Refills: 1 | Status: SHIPPED | OUTPATIENT
Start: 2024-03-22

## 2024-03-22 NOTE — PROGRESS NOTES
Subjective     Patient ID: Wanda Hirsch is a 48 y.o. female.    Chief Complaint: Follow-up    Ms. Muñoz is a 48 year old female here today for chronic neck pain for years and new dizzy spells and short term memory loss x 1 month. Patient went to the ER on 2/27/24 for neck pain, nausea, and vomiting in which she received PO Norco, IV Zofran, and IV Norflex. She states OTC Zofran doesn't help with the nausea, but Phenergan does. Patient has not followed up with any specialists (ENT, Rheumatology, Neurosurgery) due to her insurance not covering the visits. She states she has a iFOBT kit on the way to her house, in which she is due for a colorectal cancer screening. Patient is also due for a mammogram. Also, patient states she finished the Chantix starter pack and it helped, but the prescription for Chantix was not covered by her insurance. Lastly, she is requesting a drug test screening due to people breaking into her house and she is suspicious.     Neck Pain   This is a chronic problem. The current episode started more than 1 year ago. The problem occurs constantly. The problem has been waxing and waning. The quality of the pain is described as aching and burning. The pain is at a severity of 10/10. The pain is severe. Associated symptoms include headaches. Pertinent negatives include no chest pain, fever, leg pain, pain with swallowing or trouble swallowing.     Review of Systems   Constitutional:  Negative for fever.   HENT:  Negative for trouble swallowing.    Eyes: Negative.    Respiratory:  Negative for cough and shortness of breath.    Cardiovascular:  Negative for chest pain.   Gastrointestinal:  Positive for nausea and vomiting. Negative for abdominal pain.   Endocrine: Negative.    Genitourinary: Negative.    Musculoskeletal:  Positive for back pain and neck pain. Negative for leg pain.   Integumentary:  Negative.   Allergic/Immunologic: Negative.    Neurological:  Positive for dizziness, headaches and  memory loss.   Hematological: Negative.    Psychiatric/Behavioral: Negative.       Past Medical History:   Diagnosis Date    Anxiety     Arthritis     COPD (chronic obstructive pulmonary disease)     COVID-19     sept 2021    IBS (irritable bowel syndrome)     Interstitial cystitis (chronic) with hematuria     per patient    Lupus     per patient    Migraine headache     Mixed connective tissue disease     per patient    Nephrolithiasis            Objective     Physical Exam  Vitals reviewed.   Constitutional:       General: She is not in acute distress.     Appearance: Normal appearance. She is not ill-appearing, toxic-appearing or diaphoretic.   Cardiovascular:      Rate and Rhythm: Normal rate and regular rhythm.      Heart sounds: Normal heart sounds. No murmur heard.     No gallop.   Pulmonary:      Effort: No respiratory distress.      Breath sounds: No stridor. Examination of the right-upper field reveals rhonchi. Examination of the left-upper field reveals rhonchi. Rhonchi present. No wheezing or rales.   Chest:      Chest wall: No tenderness.   Abdominal:      General: Bowel sounds are normal.      Palpations: Abdomen is soft.      Tenderness: There is abdominal tenderness.   Musculoskeletal:      Right lower leg: No edema.      Left lower leg: No edema.   Neurological:      Mental Status: She is alert.   Psychiatric:         Behavior: Behavior is cooperative.        Assessment and Plan     1. Memory loss  -     Ambulatory referral/consult to Neurology; Future; Expected date: 03/29/2024  -     MRI Brain W WO Contrast; Future; Expected date: 03/22/2024    2. Cervical radicular pain  -     HYDROcodone-acetaminophen (NORCO)  mg per tablet; Take 1 tablet by mouth every 6 (six) hours as needed for Pain.  Dispense: 20 tablet; Refill: 0  -     Ambulatory referral/consult to Neurology; Future; Expected date: 03/29/2024    3. Sciatica of right side  -     HYDROcodone-acetaminophen (NORCO)  mg per tablet;  Take 1 tablet by mouth every 6 (six) hours as needed for Pain.  Dispense: 20 tablet; Refill: 0  -     Ambulatory referral/consult to Neurology; Future; Expected date: 03/29/2024    4. Displaced fracture of distal phalanx of right middle finger, initial encounter for closed fracture  -     HYDROcodone-acetaminophen (NORCO)  mg per tablet; Take 1 tablet by mouth every 6 (six) hours as needed for Pain.  Dispense: 20 tablet; Refill: 0    5. Tobacco abuse  -     varenicline (CHANTIX) 1 mg Tab; Take 1 tablet (1 mg total) by mouth 2 (two) times daily.  Dispense: 60 tablet; Refill: 3    6. Dizziness and giddiness  -     Comprehensive Metabolic Panel; Future; Expected date: 03/22/2024  -     TSH; Future; Expected date: 03/22/2024  -     CBC Auto Differential; Future; Expected date: 03/22/2024  -     Toxicology Screen, Urine  -     HEAVY METALS SCREEN, BLOOD (QUANTITATIVE); Future; Expected date: 03/22/2024  -     MRI Brain W WO Contrast; Future; Expected date: 03/22/2024    Other orders  -     promethazine (PHENERGAN) 25 MG tablet; Take 1 tablet (25 mg total) by mouth every 6 (six) hours as needed for Nausea.  Dispense: 15 tablet; Refill: 1            Follow up in about 3 months (around 6/22/2024).

## 2024-03-25 ENCOUNTER — PATIENT MESSAGE (OUTPATIENT)
Dept: ADMINISTRATIVE | Facility: HOSPITAL | Age: 49
End: 2024-03-25
Payer: MEDICAID

## 2024-03-25 LAB
ARSENIC BLD-MCNC: <1 NG/ML
CADMIUM BLD-MCNC: 1 NG/ML
CITY: NORMAL
COUNTY: NORMAL
GUARDIAN FIRST NAME: NORMAL
GUARDIAN LAST NAME: NORMAL
HOME PHONE: NORMAL
LEAD BLD-MCNC: <1 MCG/DL
MERCURY BLD-MCNC: <1 NG/ML
RACE: NORMAL
STATE: NORMAL
STREET ADDRESS: NORMAL
VENOUS/CAPILLARY: NORMAL
ZIP: NORMAL

## 2024-04-01 ENCOUNTER — TELEPHONE (OUTPATIENT)
Dept: PRIMARY CARE CLINIC | Facility: CLINIC | Age: 49
End: 2024-04-01
Payer: MEDICAID

## 2024-04-01 NOTE — TELEPHONE ENCOUNTER
----- Message from Adria Muse III, PA-C sent at 3/26/2024  7:02 AM CDT -----  Wanda,    Your test for heavy metals was negative. However, your urine drug screen shows positive to THC, fiorcet, and xanax. All of these together is likely the cause of your symptoms. As long as you are on THC, I do not recommend any more norco and Xanax. Will need to get a urine drug screen before any more refills.

## 2024-04-17 LAB — HEMOCCULT STL QL IA: POSITIVE

## 2024-04-18 ENCOUNTER — TELEPHONE (OUTPATIENT)
Dept: PRIMARY CARE CLINIC | Facility: CLINIC | Age: 49
End: 2024-04-18
Payer: MEDICAID

## 2024-04-18 ENCOUNTER — PATIENT MESSAGE (OUTPATIENT)
Dept: PRIMARY CARE CLINIC | Facility: CLINIC | Age: 49
End: 2024-04-18
Payer: MEDICAID

## 2024-04-18 DIAGNOSIS — R73.09 ELEVATED GLUCOSE: Primary | ICD-10-CM

## 2024-04-18 DIAGNOSIS — R19.5 HEMATEST POSITIVE STOOLS: Primary | ICD-10-CM

## 2024-04-18 NOTE — TELEPHONE ENCOUNTER
----- Message from Adria Muse III, PA-C sent at 4/18/2024  2:17 PM CDT -----  Wanda,     The screening test for colon cancer is positive. The negative statement was incorrect. I Have placed an order for you to have a colonoscopy. The team should contact you. I recommend checking an [facility] blood test for diabetes. Will base treatment of off that.

## 2024-04-24 ENCOUNTER — HOSPITAL ENCOUNTER (OUTPATIENT)
Dept: RADIOLOGY | Facility: HOSPITAL | Age: 49
Discharge: HOME | End: 2024-04-24
Attending: PHYSICIAN ASSISTANT
Payer: MEDICAID

## 2024-04-24 ENCOUNTER — TELEPHONE (OUTPATIENT)
Dept: CARDIOLOGY | Facility: CLINIC | Age: 49
End: 2024-04-24
Payer: MEDICAID

## 2024-04-24 DIAGNOSIS — G43.819 OTHER MIGRAINE WITHOUT STATUS MIGRAINOSUS, INTRACTABLE: ICD-10-CM

## 2024-04-24 DIAGNOSIS — R42 DIZZINESS AND GIDDINESS: ICD-10-CM

## 2024-04-24 DIAGNOSIS — R41.3 MEMORY LOSS: ICD-10-CM

## 2024-04-24 PROCEDURE — 70553 MRI BRAIN STEM W/O & W/DYE: CPT | Mod: 26,,, | Performed by: RADIOLOGY

## 2024-04-24 PROCEDURE — 25500020 PHARM REV CODE 255: Mod: PO | Performed by: PHYSICIAN ASSISTANT

## 2024-04-24 PROCEDURE — A9585 GADOBUTROL INJECTION: HCPCS | Mod: PO | Performed by: PHYSICIAN ASSISTANT

## 2024-04-24 PROCEDURE — 70553 MRI BRAIN STEM W/O & W/DYE: CPT | Mod: TC,PO

## 2024-04-24 RX ORDER — GADOBUTROL 604.72 MG/ML
7 INJECTION INTRAVENOUS
Status: COMPLETED | OUTPATIENT
Start: 2024-04-24 | End: 2024-04-24

## 2024-04-24 RX ADMIN — GADOBUTROL 7 ML: 604.72 INJECTION INTRAVENOUS at 10:04

## 2024-04-24 NOTE — TELEPHONE ENCOUNTER
----- Message from Erma Bernal sent at 4/24/2024  4:38 PM CDT -----  Contact: Patient  Type:  Test Results    Who Called: Patient     Name of Test (Lab/Mammo/Etc): Holter monitor     Date of Test: monitor dropped off 04/24/24    Ordering Provider: Chase    Would the patient rather a call back or a response via [facility]? Call    Best Call Back Number: [phone] ([place])     Additional Information:  Please call to advise

## 2024-04-25 RX ORDER — BUTALBITAL, ACETAMINOPHEN AND CAFFEINE 50; 325; 40 MG/1; MG/1; MG/1
1 TABLET ORAL EVERY 6 HOURS PRN
Qty: 40 TABLET | Refills: 0 | Status: SHIPPED | OUTPATIENT
Start: 2024-04-25

## 2024-05-17 ENCOUNTER — HOSPITAL ENCOUNTER (OUTPATIENT)
Dept: RADIOLOGY | Facility: HOSPITAL | Age: 49
Discharge: HOME | End: 2024-05-17
Attending: PHYSICIAN ASSISTANT
Payer: MEDICAID

## 2024-05-21 ENCOUNTER — PATIENT MESSAGE (OUTPATIENT)
Dept: PRIMARY CARE CLINIC | Facility: CLINIC | Age: 49
End: 2024-05-21
Payer: MEDICAID

## 2024-05-21 ENCOUNTER — TELEPHONE (OUTPATIENT)
Dept: GASTROENTEROLOGY | Facility: CLINIC | Age: 49
End: 2024-05-21
Payer: MEDICAID

## 2024-05-21 NOTE — TELEPHONE ENCOUNTER
Attempted to set up an appointment with patient due to the problems that the patient was describing in her message and due to her insurance, unable to set up an appointment in clinic, patient is a patient of Dr. Lara

## 2024-06-07 ENCOUNTER — HOSPITAL ENCOUNTER (OUTPATIENT)
Dept: RADIOLOGY | Facility: HOSPITAL | Age: 49
Discharge: HOME | End: 2024-06-07
Attending: PHYSICIAN ASSISTANT
Payer: MEDICAID

## 2024-06-26 ENCOUNTER — HOSPITAL ENCOUNTER (OUTPATIENT)
Dept: RADIOLOGY | Facility: HOSPITAL | Age: 49
Discharge: HOME | End: 2024-06-26
Attending: PHYSICIAN ASSISTANT
Payer: MEDICAID

## 2024-06-27 ENCOUNTER — TELEPHONE (OUTPATIENT)
Dept: PRIMARY CARE CLINIC | Facility: CLINIC | Age: 49
End: 2024-06-27
Payer: MEDICAID

## 2024-07-18 ENCOUNTER — TELEPHONE (OUTPATIENT)
Dept: PRIMARY CARE CLINIC | Facility: CLINIC | Age: 49
End: 2024-07-18

## 2024-07-18 ENCOUNTER — OFFICE VISIT (OUTPATIENT)
Dept: PRIMARY CARE CLINIC | Facility: CLINIC | Age: 49
End: 2024-07-18
Payer: MEDICAID

## 2024-07-18 ENCOUNTER — PATIENT MESSAGE (OUTPATIENT)
Dept: PRIMARY CARE CLINIC | Facility: CLINIC | Age: 49
End: 2024-07-18

## 2024-07-18 VITALS
BODY MASS INDEX: 31.29 KG/M2 | WEIGHT: 159.38 LBS | HEIGHT: 60 IN | OXYGEN SATURATION: 97 % | DIASTOLIC BLOOD PRESSURE: 80 MMHG | SYSTOLIC BLOOD PRESSURE: 132 MMHG | HEART RATE: 79 BPM

## 2024-07-18 DIAGNOSIS — G43.819 OTHER MIGRAINE WITHOUT STATUS MIGRAINOSUS, INTRACTABLE: ICD-10-CM

## 2024-07-18 DIAGNOSIS — Z12.11 SCREENING FOR COLON CANCER: ICD-10-CM

## 2024-07-18 DIAGNOSIS — F11.90 CHRONIC NARCOTIC USE: ICD-10-CM

## 2024-07-18 DIAGNOSIS — E78.5 HYPERLIPIDEMIA, UNSPECIFIED HYPERLIPIDEMIA TYPE: Primary | ICD-10-CM

## 2024-07-18 DIAGNOSIS — M32.9 SYSTEMIC LUPUS ERYTHEMATOSUS, UNSPECIFIED SLE TYPE, UNSPECIFIED ORGAN INVOLVEMENT STATUS: ICD-10-CM

## 2024-07-18 DIAGNOSIS — R73.09 ELEVATED GLUCOSE: ICD-10-CM

## 2024-07-18 LAB
AMPHET+METHAMPHET UR QL: ABNORMAL
BARBITURATES UR QL SCN>200 NG/ML: ABNORMAL
BENZODIAZ UR QL SCN>200 NG/ML: ABNORMAL
BZE UR QL SCN: NEGATIVE
CANNABINOIDS UR QL SCN: ABNORMAL
CREAT UR-MCNC: 134 MG/DL (ref 15–325)
ETHANOL UR-MCNC: <10 MG/DL
METHADONE UR QL SCN>300 NG/ML: NEGATIVE
OPIATES UR QL SCN: ABNORMAL
PCP UR QL SCN>25 NG/ML: NEGATIVE
TOXICOLOGY INFORMATION: ABNORMAL

## 2024-07-18 PROCEDURE — 3079F DIAST BP 80-89 MM HG: CPT | Mod: CPTII,S$GLB,, | Performed by: PHYSICIAN ASSISTANT

## 2024-07-18 PROCEDURE — 1160F RVW MEDS BY RX/DR IN RCRD: CPT | Mod: CPTII,S$GLB,, | Performed by: PHYSICIAN ASSISTANT

## 2024-07-18 PROCEDURE — 99214 OFFICE O/P EST MOD 30 MIN: CPT | Mod: S$GLB,,, | Performed by: PHYSICIAN ASSISTANT

## 2024-07-18 PROCEDURE — 80307 DRUG TEST PRSMV CHEM ANLYZR: CPT | Performed by: PHYSICIAN ASSISTANT

## 2024-07-18 PROCEDURE — 3075F SYST BP GE 130 - 139MM HG: CPT | Mod: CPTII,S$GLB,, | Performed by: PHYSICIAN ASSISTANT

## 2024-07-18 PROCEDURE — 1159F MED LIST DOCD IN RCRD: CPT | Mod: CPTII,S$GLB,, | Performed by: PHYSICIAN ASSISTANT

## 2024-07-18 PROCEDURE — 3008F BODY MASS INDEX DOCD: CPT | Mod: CPTII,S$GLB,, | Performed by: PHYSICIAN ASSISTANT

## 2024-07-18 RX ORDER — TEMAZEPAM 15 MG/1
15 CAPSULE ORAL NIGHTLY PRN
COMMUNITY
Start: 2024-06-26

## 2024-07-18 RX ORDER — PROPRANOLOL HYDROCHLORIDE 20 MG/1
20 TABLET ORAL 2 TIMES DAILY
Qty: 180 TABLET | Refills: 3 | Status: SHIPPED | OUTPATIENT
Start: 2024-07-18

## 2024-07-18 RX ORDER — BUTALBITAL, ACETAMINOPHEN AND CAFFEINE 50; 325; 40 MG/1; MG/1; MG/1
1 TABLET ORAL EVERY 6 HOURS PRN
Qty: 40 TABLET | Refills: 0 | Status: SHIPPED | OUTPATIENT
Start: 2024-07-18

## 2024-07-18 RX ORDER — AMOXICILLIN 875 MG/1
875 TABLET, FILM COATED ORAL 2 TIMES DAILY
Qty: 20 TABLET | Refills: 0 | Status: SHIPPED | OUTPATIENT
Start: 2024-07-18 | End: 2024-07-28

## 2024-07-18 RX ORDER — NEOMYCIN SULFATE, POLYMYXIN B SULFATE AND HYDROCORTISONE 10; 3.5; 1 MG/ML; MG/ML; [USP'U]/ML
3 SUSPENSION/ DROPS AURICULAR (OTIC) 3 TIMES DAILY
Qty: 10 ML | Refills: 0 | Status: SHIPPED | OUTPATIENT
Start: 2024-07-18

## 2024-07-18 RX ORDER — LIDOCAINE 36 MG/1
1 PATCH TOPICAL
COMMUNITY
Start: 2024-05-01

## 2024-07-18 RX ORDER — DEXTROAMPHETAMINE SACCHARATE, AMPHETAMINE ASPARTATE, DEXTROAMPHETAMINE SULFATE, AND AMPHETAMINE SULFATE 1.25; 1.25; 1.25; 1.25 MG/1; MG/1; MG/1; MG/1
1 TABLET ORAL 2 TIMES DAILY
COMMUNITY
Start: 2024-06-26

## 2024-07-18 RX ORDER — TIZANIDINE 4 MG/1
4 TABLET ORAL 3 TIMES DAILY
COMMUNITY
Start: 2024-06-26

## 2024-07-18 RX ORDER — ATORVASTATIN CALCIUM 40 MG/1
40 TABLET, FILM COATED ORAL
COMMUNITY

## 2024-07-18 RX ORDER — LIDOCAINE 50 MG/G
1 PATCH TOPICAL
COMMUNITY
Start: 2024-06-27

## 2024-07-18 NOTE — PROGRESS NOTES
Subjective     Patient ID: Wanda Hirsch is a 49 y.o. female.    Chief Complaint: Headache and Otalgia    Headache   Associated symptoms include back pain and ear pain. Pertinent negatives include no abdominal pain or fever.   Otalgia   There is pain in both ears. This is a new problem. The current episode started in the past 7 days. The problem occurs constantly. The problem has been unchanged. There has been no fever. The pain is moderate. Associated symptoms include headaches. Pertinent negatives include no abdominal pain. She has tried nothing for the symptoms. The treatment provided no relief.     Review of Systems   Constitutional:  Negative for activity change, fatigue and fever.   HENT:  Positive for ear pain.    Respiratory:  Negative for chest tightness and shortness of breath.    Cardiovascular:  Negative for chest pain.   Gastrointestinal:  Negative for abdominal pain.   Musculoskeletal:  Positive for arthralgias, back pain, leg pain and myalgias.   Neurological:  Positive for headaches.          Objective     Physical Exam  Vitals reviewed.   Constitutional:       General: She is not in acute distress.     Appearance: Normal appearance. She is not ill-appearing, toxic-appearing or diaphoretic.   Cardiovascular:      Rate and Rhythm: Normal rate and regular rhythm.      Pulses: Normal pulses.      Heart sounds: Normal heart sounds. No murmur heard.     No friction rub. No gallop.   Pulmonary:      Effort: Pulmonary effort is normal. No respiratory distress.      Breath sounds: Normal breath sounds. No stridor. No wheezing, rhonchi or rales.   Chest:      Chest wall: No tenderness.   Abdominal:      Palpations: Abdomen is soft.      Tenderness: There is no abdominal tenderness.   Neurological:      Mental Status: She is alert.            Assessment and Plan     1. Hyperlipidemia, unspecified hyperlipidemia type  -     Lipid Panel; Future; Expected date: 07/18/2024    2. Other migraine without status  migrainosus, intractable  -     propranoloL (INDERAL) 20 MG tablet; Take 1 tablet (20 mg total) by mouth 2 (two) times daily.  Dispense: 180 tablet; Refill: 3    3. Systemic lupus erythematosus, unspecified SLE type, unspecified organ involvement status  -     Comprehensive Metabolic Panel; Future; Expected date: 07/18/2024    4. Screening for colon cancer  -     Case Request Endoscopy: COLONOSCOPY    5. Elevated glucose  -     Hemoglobin A1C; Future; Expected date: 07/18/2024    6. Chronic narcotic use  -     Toxicology Screen, Urine    Other orders  -     amoxicillin (AMOXIL) 875 MG tablet; Take 1 tablet (875 mg total) by mouth 2 (two) times daily. for 10 days  Dispense: 20 tablet; Refill: 0  -     neomycin-polymyxin-hydrocortisone (CORTISPORIN) 3.5-10,000-1 mg/mL-unit/mL-% otic suspension; Place 3 drops into both ears 3 (three) times daily.  Dispense: 10 mL; Refill: 0        I spent 30 minutes on this encounter, time includes face-to-face, chart review, documentation, test review and orders.           No follow-ups on file.

## 2024-07-22 ENCOUNTER — TELEPHONE (OUTPATIENT)
Dept: PRIMARY CARE CLINIC | Facility: CLINIC | Age: 49
End: 2024-07-22
Payer: MEDICAID

## 2024-07-22 ENCOUNTER — TELEPHONE (OUTPATIENT)
Dept: GASTROENTEROLOGY | Facility: CLINIC | Age: 49
End: 2024-07-22
Payer: MEDICAID

## 2024-07-22 NOTE — TELEPHONE ENCOUNTER
----- Message from Adria Mues III, PA-C sent at 7/21/2024  7:34 AM CDT -----  Wanda,    Your urine tested positive for THC and opiates. Your are already on 2 medications that can decrease respiration. For your safety and [facility] guidelines , I am not going to prescribe opiates for you. I recommend following up with pain management or another primary care provider if you feel you need this medication.

## 2024-07-22 NOTE — TELEPHONE ENCOUNTER
Attempted to contact pt to schedule scope. Mailbox is full, unable to leave a message. [employer] message sent

## 2024-09-25 ENCOUNTER — TELEPHONE (OUTPATIENT)
Dept: GASTROENTEROLOGY | Facility: CLINIC | Age: 49
End: 2024-09-25
Payer: MEDICAID

## 2024-09-25 NOTE — TELEPHONE ENCOUNTER
Attempted to contact pt to schedule scope. No answer, unable to leave a message. [employer] message sent. Several attempts have been made to contact pt to schedule ordered procedure. Case request canceled. Letter placed in mail.  Pt will be scheduled from this request upon call back.